# Patient Record
Sex: FEMALE | Race: WHITE | Employment: FULL TIME | URBAN - METROPOLITAN AREA
[De-identification: names, ages, dates, MRNs, and addresses within clinical notes are randomized per-mention and may not be internally consistent; named-entity substitution may affect disease eponyms.]

---

## 2017-05-11 ENCOUNTER — OFFICE VISIT (OUTPATIENT)
Dept: SURGERY | Age: 27
End: 2017-05-11

## 2017-05-11 VITALS
BODY MASS INDEX: 21.09 KG/M2 | SYSTOLIC BLOOD PRESSURE: 111 MMHG | HEART RATE: 83 BPM | DIASTOLIC BLOOD PRESSURE: 77 MMHG | WEIGHT: 119 LBS | HEIGHT: 63 IN

## 2017-05-11 DIAGNOSIS — Z80.3 FAMILY HISTORY OF BREAST CANCER: Primary | ICD-10-CM

## 2017-05-11 RX ORDER — NORGESTIMATE AND ETHINYL ESTRADIOL 7DAYSX3 LO
KIT ORAL
Refills: 0 | COMMUNITY
Start: 2017-04-11 | End: 2022-03-29

## 2017-05-11 NOTE — PROGRESS NOTES
HISTORY OF PRESENT ILLNESS  Shakeel Wilkerson is a 24 y.o. female. HPI  NEW patient consult referred by Dr. Albert Santiago for high risk family history of breast cancer. The last week she has noticed an annoying discomfort or soreness to her RIGHT breast.  Denies any breast, skin, or nipple changes. OB hx:  Period started age 17. LMP: 2017  First child age 27.  2 children. Breast fed. No hysterectomy or oophorectomy. On OCP  Family History: Mother  from breast cancer, diagnosed age 50, mastectomy and chemo,  age 48 from lung mets. Maternal grandmother  from breast cancer in her late 52's. Maternal aunt had breast cancer in her late 29's and is a survivor. Pt does not have sisters. 2 1/2 yr old daughter and 3 yr old son. No other      No imaging. Review of Systems   Constitutional: Negative. Breast pain with periods  Irregular periods   HENT: Negative. Eyes: Negative. Respiratory: Negative. Cardiovascular: Negative. Gastrointestinal: Negative. Genitourinary: Negative. Musculoskeletal: Negative. Skin: Negative. Neurological: Negative. Endo/Heme/Allergies: Negative. Psychiatric/Behavioral: The patient is nervous/anxious. Physical Exam   Constitutional: She is oriented to person, place, and time. She appears well-developed and well-nourished. Cardiovascular: Normal rate, regular rhythm and normal heart sounds. Pulmonary/Chest: Effort normal and breath sounds normal. Right breast exhibits no mass, no nipple discharge, no skin change and no tenderness. Left breast exhibits no mass, no nipple discharge, no skin change and no tenderness. Breasts are symmetrical.   Lymphadenopathy:     She has no cervical adenopathy. She has no axillary adenopathy. Right: No supraclavicular adenopathy present. Left: No supraclavicular adenopathy present. Neurological: She is alert and oriented to person, place, and time.    Skin: Skin is warm and dry.     BREAST ULTRASOUND  Indication: Bilateral  breast screening ultrasound  Technique: Both breast were scanned, all 4 quadrants, with a high-frequency linear-array near-field transducer  Findings: No abnormal mass, lesion, or shadowing noted. No cysts  Impression: Normal breast tissue   Disposition: No worrisome finding on ultrasound  ASSESSMENT and PLAN    ICD-10-CM ICD-9-CM    1. Family history of breast cancer Z80.3 V16.3 [facility]-ANALYSIS     1. No worrisome finding on physical exam or ultrasound. 2. Pt would like to have bilateral mastectomies with reconstruction. She has already met with Dr Lynn Sainz. She thinks she likely will have it done at the end of the summer. We discussed MRI screening if she decides not to have the surgery  3. She was BRCA tested today. PLAN:  Pt will contact the office when she is ready to schedule surgery.     She has [employer] [facility] insurance and would like to have surgery at [facility].

## 2017-05-11 NOTE — PATIENT INSTRUCTIONS
Breast Cancer (BRCA) Gene Testing: Care Instructions  Your Care Instructions  BRCA1 and BRCA2 are genes that help control normal cell growth. Sometimes, people inherit changes in one of these genes. These changes are called mutations. If you inherit a BRCA (say \"Susanne\") mutation, you have a greater risk of breast or ovarian cancer. You also have a higher risk of breast or ovarian cancer if you have a family history of them. Some people have a family history, but they don't have the BRCA mutation. To find out if you have the BRCA mutation, you can have a blood test. People who have the mutation have a higher risk for these cancers. But not everyone with the mutation gets cancer. Talk to your doctor about things that may increase your risk. Let your doctor know if you or a family member had or has:  · A positive test for BRCA. · Breast cancer before age 48. · Ovarian cancer at any age. · Male breast cancer. · Breast cancer in both breasts. · Both breast and ovarian cancer. · [place]. Your doctor may also want you to talk with a genetic counselor. The counselor can help you understand what the results of this test might mean. Follow-up care is a key part of your treatment and safety. Be sure to make and go to all appointments, and call your doctor if you are having problems. It's also a good idea to know your test results and keep a list of the medicines you take. Why should you have BRCA testing? You may feel better if the test shows that you don't have a BRCA mutation. This is called a negative result. If the test shows that you do have a BRCA mutation, it's called a positive result. In this case, you may be able to make some decisions that could reduce your cancer risk. The information may also be helpful to your family and loved ones. What are the risks of BRCA testing? A negative test may give you a false sense of security.  So you may not have the regular tests that help find cancer at an early stage. But a negative BRCA test does not mean that you will never have breast or ovarian cancer. A positive test result may cause anxiety or depression. A positive BRCA test does not mean that you will definitely get breast or ovarian cancer. It's important to think carefully about what the test results could mean for you. A genetic counselor can help you do this. What can you do to reduce the risk of breast cancer? Your risk for breast cancer increases as you get older. There is no known way to prevent breast cancer. But with some cancers, finding them early can increase your chances of successful treatment. Here are some steps you can take to help reduce your risk:  · Get familiar with the look and feel of your breasts. This will help you notice any changes. Call your doctor if you notice a change. · Have regular breast exams by your doctor or nurse. Ask your doctor how often you should get them. · Have regular mammograms. A mammogram is a picture of your breast tissue. It can find changes in your breast before you can feel them. Talk to your doctor about when to get this test.  You can also help take care of yourself and reduce your risk of cancer if you:  · Stay at a healthy weight. · Eat a healthy, low-fat diet. · Get some exercise every day. If you don't usually exercise, walking is a good way to start. · Don't smoke. If you need help quitting, talk to your doctor about stop-smoking programs and medicines. These can increase your chances of quitting for good. · Drink alcohol in moderation. Or don't drink alcohol at all. · Breastfeed. There is some evidence that breastfeeding may lower the risk of breast cancer. The benefit seems to be greatest in women who have  for longer than 12 months or who  several children. Men and women who do a gene test and find out that they have a BRCA gene change have some options to manage their cancer risk.   · Women who haven't had cancer may want to think about starting breast cancer screening at a younger age, taking medicine, and having preventive surgery. · Men may want to think about doing breast self-exams, having clinical breast exams, and having prostate cancer screening. If you have a BRCA gene change, talk with your doctor. He or she will help you manage your cancer risk. Where can you learn more? Go to http://jordan-bishop.info/. Enter U252 in the search box to learn more about \"Breast Cancer (BRCA) Gene Testing: Care Instructions. \"  Current as of: August 31, 2016  Content Version: 11.2  © 7013-2544 [employer]. Care instructions adapted under license by [employer] (which disclaims liability or warranty for this information). If you have questions about a medical condition or this instruction, always ask your healthcare professional. [address] any warranty or liability for your use of this information.

## 2017-05-12 NOTE — COMMUNICATION BODY
HISTORY OF PRESENT ILLNESS  Mikayla Rice is a 24 y.o. female. HPI  NEW patient consult referred by Dr. Edson Pimentel for high risk family history of breast cancer. The last week she has noticed an annoying discomfort or soreness to her RIGHT breast.  Denies any breast, skin, or nipple changes. OB hx:  Period started age 17. LMP: 2017  First child age 27.  2 children. Breast fed. No hysterectomy or oophorectomy. On OCP  Family History: Mother  from breast cancer, diagnosed age 50, mastectomy and chemo,  age 48 from lung mets. Maternal grandmother  from breast cancer in her late 52's. Maternal aunt had breast cancer in her late 29's and is a survivor. Pt does not have sisters. 2 1/2 yr old daughter and 3 yr old son. No other      No imaging. Review of Systems   Constitutional: Negative. Breast pain with periods  Irregular periods   HENT: Negative. Eyes: Negative. Respiratory: Negative. Cardiovascular: Negative. Gastrointestinal: Negative. Genitourinary: Negative. Musculoskeletal: Negative. Skin: Negative. Neurological: Negative. Endo/Heme/Allergies: Negative. Psychiatric/Behavioral: The patient is nervous/anxious. Physical Exam   Constitutional: She is oriented to person, place, and time. She appears well-developed and well-nourished. Cardiovascular: Normal rate, regular rhythm and normal heart sounds. Pulmonary/Chest: Effort normal and breath sounds normal. Right breast exhibits no mass, no nipple discharge, no skin change and no tenderness. Left breast exhibits no mass, no nipple discharge, no skin change and no tenderness. Breasts are symmetrical.   Lymphadenopathy:     She has no cervical adenopathy. She has no axillary adenopathy. Right: No supraclavicular adenopathy present. Left: No supraclavicular adenopathy present. Neurological: She is alert and oriented to person, place, and time.    Skin: Skin is warm and dry.     BREAST ULTRASOUND  Indication: Bilateral  breast screening ultrasound  Technique: Both breast were scanned, all 4 quadrants, with a high-frequency linear-array near-field transducer  Findings: No abnormal mass, lesion, or shadowing noted. No cysts  Impression: Normal breast tissue   Disposition: No worrisome finding on ultrasound  ASSESSMENT and PLAN    ICD-10-CM ICD-9-CM    1. Family history of breast cancer Z80.3 V16.3 [facility]-ANALYSIS     1. No worrisome finding on physical exam or ultrasound. 2. Pt would like to have bilateral mastectomies with reconstruction. She has already met with Dr Maty Crook. She thinks she likely will have it done at the end of the summer. We discussed MRI screening if she decides not to have the surgery  3. She was BRCA tested today. PLAN:  Pt will contact the office when she is ready to schedule surgery.     She has [employer] [facility] insurance and would like to have surgery at [facility].

## 2017-05-26 ENCOUNTER — DOCUMENTATION ONLY (OUTPATIENT)
Dept: SURGERY | Age: 27
End: 2017-05-26

## 2017-05-26 NOTE — PROGRESS NOTES
Received call from [facility] to confirm collection date of saliva specimen for genetic testing (5/11/17) and what test was ordered (Breast Next).

## 2017-05-31 ENCOUNTER — TELEPHONE (OUTPATIENT)
Dept: SURGERY | Age: 27
End: 2017-05-31

## 2022-02-24 ENCOUNTER — OFFICE VISIT (OUTPATIENT)
Dept: SURGERY | Age: 32
End: 2022-02-24
Payer: COMMERCIAL

## 2022-02-24 ENCOUNTER — HOSPITAL ENCOUNTER (OUTPATIENT)
Dept: MAMMOGRAPHY | Age: 32
Discharge: HOME | End: 2022-02-24
Attending: SURGERY
Payer: COMMERCIAL

## 2022-02-24 VITALS
SYSTOLIC BLOOD PRESSURE: 122 MMHG | HEIGHT: 64 IN | BODY MASS INDEX: 20.49 KG/M2 | HEART RATE: 93 BPM | WEIGHT: 120 LBS | DIASTOLIC BLOOD PRESSURE: 82 MMHG

## 2022-02-24 DIAGNOSIS — Z01.818 PREOP EXAMINATION: ICD-10-CM

## 2022-02-24 DIAGNOSIS — Z01.818 PREOP EXAMINATION: Primary | ICD-10-CM

## 2022-02-24 DIAGNOSIS — Z80.3 FAMILY HX-BREAST MALIGNANCY: ICD-10-CM

## 2022-02-24 PROCEDURE — 99203 OFFICE O/P NEW LOW 30 MIN: CPT | Performed by: SURGERY

## 2022-02-24 PROCEDURE — 77067 SCR MAMMO BI INCL CAD: CPT

## 2022-02-24 NOTE — LETTER
2/24/2022    Patient: Christie Chowdary   YOB: 1990   Date of Visit: 2/24/2022     Dave Suárez NP  [address] [facility] [address]  Via Fax: [phone]    Dear Dave Paredes.HORTENSIA,      Thank you for referring Ms. Marcy Chavez to [facility] for evaluation. My notes for this consultation are attached. If you have questions, please do not hesitate to call me. I look forward to following your patient along with you.       Sincerely,    Shamika Lindo MD

## 2022-02-24 NOTE — PROGRESS NOTES
HISTORY OF PRESENT ILLNESS  Sun Galeano is a 36 y.o. female. HPI ESTABLISHED patient referred by Radha Archibald to discuss prophylacitc mastectomies. She has a strong family hx of breast cancer. She has a CHEK2 VUS (2017, no update from [facility]). She does not feel any breast masses. Has occasional random shooting pains in the breasts. 2017 VUS CHEK2    Family History: Mother  from breast cancer, diagnosed age 50, mastectomy and chemo,  age 48 from lung mets. Maternal grandmother  from breast cancer in her late 52's. Maternal aunt had breast cancer in her late 29's and is a survivor. Now in her 52's with stroke and MI. Pt does not have sisters. 7yr old daughter and 10 yr old son. First screening mammogram today  [facility] Results (most recent):  Results from [place] encounter on 22    [facility] 3D STACEY W MAMMO BI SCREENING INCL CAD    Narrative  STUDY:  Bilateral Digital Screening 3D breast tomosynthesis is performed. INDICATION:  Screening mammogram. Family history of breast malignancy. Preoperative examination. There is no prior mammogram for direct comparison. BREAST COMPOSITION: The breast tissue is heterogeneously dense, which could  obscure detection of small masses (approximately 51-75% glandular). FINDINGS: Bilateral digital screening mammography was performed, and is  interpreted in conjunction with a computer assisted detection (CAD) system. No  suspicious masses or calcifications are identified. There is no skin thickening  or nipple retraction. Impression  BIRADS 1: Negative. No mammographic evidence of malignancy. Recommend annual  bilateral screening mammography after age 36, unless earlier clinical  indication. In accordance with [place] legislation enacted 2012 (32.1-229 of the Code  of [place]), we have informed this patient by letter that she may have dense  breast tissue. Dense breast tissue can hide cancer or other abnormalities.  We  have instructed her to contact her referring physician if she has any questions  or concerns about this report. There are many factors that can increase a  woman's risk of developing breast cancer, including a family history of breast  cancer. A woman's lifetime risk of developing breast cancer can be estimated  using the Breast Cancer Risk Assessment Tool, found on the [employer] website (www.cancer.gov/bcrisktool/). The addition of breast MRI as a  screening tool may be useful for women with lifetime risk assessments greater  than 20%.       Past Medical History:   Diagnosis Date    Nausea & vomiting     PONV X2 surgeries    Unspecified adverse effect of anesthesia     \"takes more than usual to get me to sleep\", w/every surgery, not wisdom teeth sedation     Past Surgical History:   Procedure Laterality Date    HX ORTHOPAEDIC Right 2006    ORIF, ankle with 2 screws place    HX ORTHOPAEDIC Right 2007    screws removed from ankle    HX ORTHOPAEDIC Left 2013    ORIF, ankle w/2 wires, 8 screws and 2 plates    HX OTHER SURGICAL      wisdom teeth w/sedation    HX WISDOM TEETH EXTRACTION      RI LEG/ANKLE SURGERY PROC UNLISTED        OB History        2    Para   2    Term   2            AB        Living   2       SAB        IAB        Ectopic        Molar        Multiple   0    Live Births   2          Obstetric Comments   Menarche 17, LMP 2/15/22, # of children 2, age of 4st delivery 27, Hysterectomy/oophorectomy no/no/, Breast bx no, history of breast feeding no, BCP yes, Hormone therapy no           Family History   Problem Relation Age of Onset    Breast Cancer Mother     No Known Problems Father     No Known Problems Brother     No Known Problems Son     No Known Problems Daughter     Breast Cancer Maternal Grandmother     Breast Cancer Maternal Aunt      Social History     Tobacco Use    Smoking status: Never Smoker    Smokeless tobacco: Never Used Substance Use Topics    Alcohol use: Yes     Comment: 2 x per week      Prior to Admission medications    Medication Sig Start Date End Date Taking? Authorizing Provider   TRI-LO-SPRINTEC 0.18/0.215/0.25 mg-25 mcg tab TAKE ONE TABLET BY MOUTH EVERY DAY 4/11/17  Yes Provider, Historical      No Known Allergies     ROS    Physical Exam  Constitutional:       Appearance: She is well-developed. Cardiovascular:      Rate and Rhythm: Normal rate and regular rhythm. Heart sounds: Normal heart sounds. Pulmonary:      Effort: Pulmonary effort is normal.      Breath sounds: Normal breath sounds. Chest:   Breasts: Breasts are symmetrical.      Right: No swelling, mass, nipple discharge, skin change, tenderness, axillary adenopathy or supraclavicular adenopathy. Left: No swelling, mass, nipple discharge, skin change, tenderness, axillary adenopathy or supraclavicular adenopathy. Lymphadenopathy:      Upper Body:      Right upper body: No supraclavicular or axillary adenopathy. Left upper body: No supraclavicular or axillary adenopathy. Skin:     General: Skin is warm and dry. Neurological:      Mental Status: She is alert and oriented to person, place, and time. ASSESSMENT and PLAN    ICD-10-CM ICD-9-CM    1. Preop examination  Z01.818 V72.84 CANCELED: PAM MAMMO BI SCREENING INCL CAD   2. Family hx-breast malignancy  Z80.3 V16.3 CANCELED: PAM MAMMO BI SCREENING INCL CAD      Total time spent with patient: 30 minutes   1. Discussed prophylactic mastectomies with reconstruction. Pt had talked about nipple-sparing, direct-to-implant with a plastic surgeon previously. 2. Mammogram today, BIRADS 1  3. appt with Dr Henrietta Ferris, then we will schedule surgery. 4. Discussed one overnight in the hospital, home with drains which stay in 1-2 weeks.

## 2022-03-03 ENCOUNTER — TELEPHONE (OUTPATIENT)
Dept: SURGERY | Age: 32
End: 2022-03-03

## 2022-03-15 DIAGNOSIS — Z80.3 FAMILY HISTORY OF BREAST CANCER IN MOTHER: Primary | ICD-10-CM

## 2022-03-19 PROBLEM — Z80.3 FAMILY HX-BREAST MALIGNANCY: Status: ACTIVE | Noted: 2022-02-24

## 2022-03-29 ENCOUNTER — HOSPITAL ENCOUNTER (OUTPATIENT)
Dept: PREADMISSION TESTING | Age: 32
Discharge: HOME | End: 2022-03-29
Payer: COMMERCIAL

## 2022-03-29 VITALS
HEIGHT: 64 IN | WEIGHT: 128.8 LBS | HEART RATE: 77 BPM | RESPIRATION RATE: 18 BRPM | TEMPERATURE: 98.2 F | OXYGEN SATURATION: 100 % | DIASTOLIC BLOOD PRESSURE: 83 MMHG | BODY MASS INDEX: 21.99 KG/M2 | SYSTOLIC BLOOD PRESSURE: 124 MMHG

## 2022-03-29 LAB
ABO + RH BLD: NORMAL
BASOPHILS # BLD: 0.1 K/UL (ref 0–0.1)
BASOPHILS NFR BLD: 1 % (ref 0–1)
BLOOD GROUP ANTIBODIES SERPL: NORMAL
DIFFERENTIAL METHOD BLD: NORMAL
EOSINOPHIL # BLD: 0.1 K/UL (ref 0–0.4)
EOSINOPHIL NFR BLD: 1 % (ref 0–7)
ERYTHROCYTE [DISTWIDTH] IN BLOOD BY AUTOMATED COUNT: 11.8 % (ref 11.5–14.5)
HCT VFR BLD AUTO: 39 % (ref 35–47)
HGB BLD-MCNC: 13.5 G/DL (ref 11.5–16)
IMM GRANULOCYTES # BLD AUTO: 0 K/UL (ref 0–0.04)
IMM GRANULOCYTES NFR BLD AUTO: 0 % (ref 0–0.5)
LYMPHOCYTES # BLD: 2.8 K/UL (ref 0.8–3.5)
LYMPHOCYTES NFR BLD: 44 % (ref 12–49)
MCH RBC QN AUTO: 32.5 PG (ref 26–34)
MCHC RBC AUTO-ENTMCNC: 34.6 G/DL (ref 30–36.5)
MCV RBC AUTO: 93.8 FL (ref 80–99)
MONOCYTES # BLD: 0.3 K/UL (ref 0–1)
MONOCYTES NFR BLD: 5 % (ref 5–13)
NEUTS SEG # BLD: 3.2 K/UL (ref 1.8–8)
NEUTS SEG NFR BLD: 49 % (ref 32–75)
NRBC # BLD: 0 K/UL (ref 0–0.01)
NRBC BLD-RTO: 0 PER 100 WBC
PLATELET # BLD AUTO: 305 K/UL (ref 150–400)
PMV BLD AUTO: 9.5 FL (ref 8.9–12.9)
RBC # BLD AUTO: 4.16 M/UL (ref 3.8–5.2)
SPECIMEN EXP DATE BLD: NORMAL
WBC # BLD AUTO: 6.5 K/UL (ref 3.6–11)

## 2022-03-29 PROCEDURE — 85025 COMPLETE CBC W/AUTO DIFF WBC: CPT

## 2022-03-29 PROCEDURE — 36415 COLL VENOUS BLD VENIPUNCTURE: CPT

## 2022-03-29 PROCEDURE — 86900 BLOOD TYPING SEROLOGIC ABO: CPT

## 2022-03-29 RX ORDER — NORGESTIMATE AND ETHINYL ESTRADIOL 7DAYSX3 28
1 KIT ORAL
COMMUNITY

## 2022-03-29 RX ORDER — ACETAMINOPHEN 500 MG
1500 TABLET ORAL
COMMUNITY

## 2022-04-01 ENCOUNTER — ANESTHESIA EVENT (OUTPATIENT)
Dept: SURGERY | Age: 32
End: 2022-04-01
Payer: COMMERCIAL

## 2022-04-04 ENCOUNTER — ANESTHESIA (OUTPATIENT)
Dept: SURGERY | Age: 32
End: 2022-04-04
Payer: COMMERCIAL

## 2022-04-04 ENCOUNTER — HOSPITAL ENCOUNTER (OUTPATIENT)
Age: 32
Setting detail: OUTPATIENT SURGERY
Discharge: HOME | End: 2022-04-04
Attending: SURGERY | Admitting: SURGERY
Payer: COMMERCIAL

## 2022-04-04 VITALS
OXYGEN SATURATION: 100 % | WEIGHT: 128.53 LBS | SYSTOLIC BLOOD PRESSURE: 114 MMHG | HEART RATE: 92 BPM | BODY MASS INDEX: 21.94 KG/M2 | DIASTOLIC BLOOD PRESSURE: 78 MMHG | HEIGHT: 64 IN | TEMPERATURE: 98 F | RESPIRATION RATE: 12 BRPM

## 2022-04-04 DIAGNOSIS — Z80.3 FAMILY HISTORY OF BREAST CANCER IN MOTHER: ICD-10-CM

## 2022-04-04 LAB — HCG UR QL: NEGATIVE

## 2022-04-04 PROCEDURE — 88307 TISSUE EXAM BY PATHOLOGIST: CPT

## 2022-04-04 PROCEDURE — 77030040506 HC DRN WND MDII -A: Performed by: SURGERY

## 2022-04-04 PROCEDURE — 19303 MAST SIMPLE COMPLETE: CPT | Performed by: SURGERY

## 2022-04-04 PROCEDURE — 74011250636 HC RX REV CODE- 250/636: Performed by: SURGERY

## 2022-04-04 PROCEDURE — 77030019940 HC BLNKT HYPOTHRM STRY -B: Performed by: SURGERY

## 2022-04-04 PROCEDURE — 77030011825 HC SUPP SURG PSTOP S2SG -B: Performed by: SURGERY

## 2022-04-04 PROCEDURE — C9290 INJ, BUPIVACAINE LIPOSOME: HCPCS | Performed by: SURGERY

## 2022-04-04 PROCEDURE — 74011000272 HC RX REV CODE- 272: Performed by: SURGERY

## 2022-04-04 PROCEDURE — 76060000067 HC AMB SURG ANES 3.5 TO 4 HR: Performed by: SURGERY

## 2022-04-04 PROCEDURE — 76210000057 HC AMBSU PH II REC 1 TO 1.5 HR: Performed by: SURGERY

## 2022-04-04 PROCEDURE — 77030002933 HC SUT MCRYL J&J -A: Performed by: SURGERY

## 2022-04-04 PROCEDURE — 74011000250 HC RX REV CODE- 250: Performed by: SURGERY

## 2022-04-04 PROCEDURE — 77030026438 HC STYL ET INTUB CARD -A: Performed by: ANESTHESIOLOGY

## 2022-04-04 PROCEDURE — 81025 URINE PREGNANCY TEST: CPT

## 2022-04-04 PROCEDURE — 74011250636 HC RX REV CODE- 250/636: Performed by: ANESTHESIOLOGY

## 2022-04-04 PROCEDURE — 2709999900 HC NON-CHARGEABLE SUPPLY: Performed by: SURGERY

## 2022-04-04 PROCEDURE — 77030013079 HC BLNKT BAIR HGGR 3M -A: Performed by: ANESTHESIOLOGY

## 2022-04-04 PROCEDURE — 77030031139 HC SUT VCRL2 J&J -A: Performed by: SURGERY

## 2022-04-04 PROCEDURE — 77030008684 HC TU ET CUF COVD -B: Performed by: ANESTHESIOLOGY

## 2022-04-04 PROCEDURE — 74011000250 HC RX REV CODE- 250: Performed by: NURSE ANESTHETIST, CERTIFIED REGISTERED

## 2022-04-04 PROCEDURE — 77030002966 HC SUT PDS J&J -A: Performed by: SURGERY

## 2022-04-04 PROCEDURE — 74011000258 HC RX REV CODE- 258: Performed by: SURGERY

## 2022-04-04 PROCEDURE — 77030020061 HC IV BLD WRMR ADMIN SET 3M -B: Performed by: ANESTHESIOLOGY

## 2022-04-04 PROCEDURE — 77030002986 HC SUT PROL J&J -A: Performed by: SURGERY

## 2022-04-04 PROCEDURE — 76030000007 HC AMB SURG OR TIME 3.5 TO 4: Performed by: SURGERY

## 2022-04-04 PROCEDURE — C1789 PROSTHESIS, BREAST, IMP: HCPCS | Performed by: SURGERY

## 2022-04-04 PROCEDURE — 77030011264 HC ELECTRD BLD EXT COVD -A: Performed by: SURGERY

## 2022-04-04 PROCEDURE — 77030002916 HC SUT ETHLN J&J -A: Performed by: SURGERY

## 2022-04-04 PROCEDURE — 76210000035 HC AMBSU PH I REC 1 TO 1.5 HR: Performed by: SURGERY

## 2022-04-04 PROCEDURE — 77030040361 HC SLV COMPR DVT MDII -B

## 2022-04-04 PROCEDURE — 77030008463 HC STPLR SKN PROX J&J -B: Performed by: SURGERY

## 2022-04-04 PROCEDURE — 74011250636 HC RX REV CODE- 250/636: Performed by: NURSE ANESTHETIST, CERTIFIED REGISTERED

## 2022-04-04 PROCEDURE — 77030002996 HC SUT SLK J&J -A: Performed by: SURGERY

## 2022-04-04 DEVICE — GRAFT HUM TISS L W13XL22CM THK0.7-1.4MM THN ACELLULAR: Type: IMPLANTABLE DEVICE | Site: BREAST | Status: FUNCTIONAL

## 2022-04-04 RX ORDER — FAMOTIDINE 10 MG/ML
INJECTION INTRAVENOUS AS NEEDED
Status: DISCONTINUED | OUTPATIENT
Start: 2022-04-04 | End: 2022-04-04 | Stop reason: HOSPADM

## 2022-04-04 RX ORDER — DIPHENHYDRAMINE HYDROCHLORIDE 50 MG/ML
12.5 INJECTION, SOLUTION INTRAMUSCULAR; INTRAVENOUS AS NEEDED
Status: DISCONTINUED | OUTPATIENT
Start: 2022-04-04 | End: 2022-04-04 | Stop reason: HOSPADM

## 2022-04-04 RX ORDER — SODIUM CHLORIDE, SODIUM LACTATE, POTASSIUM CHLORIDE, CALCIUM CHLORIDE 600; 310; 30; 20 MG/100ML; MG/100ML; MG/100ML; MG/100ML
INJECTION, SOLUTION INTRAVENOUS
Status: DISCONTINUED | OUTPATIENT
Start: 2022-04-04 | End: 2022-04-04 | Stop reason: HOSPADM

## 2022-04-04 RX ORDER — SODIUM CHLORIDE, SODIUM LACTATE, POTASSIUM CHLORIDE, CALCIUM CHLORIDE 600; 310; 30; 20 MG/100ML; MG/100ML; MG/100ML; MG/100ML
125 INJECTION, SOLUTION INTRAVENOUS CONTINUOUS
Status: DISCONTINUED | OUTPATIENT
Start: 2022-04-04 | End: 2022-04-04 | Stop reason: HOSPADM

## 2022-04-04 RX ORDER — ONDANSETRON 2 MG/ML
4 INJECTION INTRAMUSCULAR; INTRAVENOUS AS NEEDED
Status: DISCONTINUED | OUTPATIENT
Start: 2022-04-04 | End: 2022-04-04 | Stop reason: HOSPADM

## 2022-04-04 RX ORDER — GLYCOPYRROLATE 0.2 MG/ML
INJECTION INTRAMUSCULAR; INTRAVENOUS AS NEEDED
Status: DISCONTINUED | OUTPATIENT
Start: 2022-04-04 | End: 2022-04-04 | Stop reason: HOSPADM

## 2022-04-04 RX ORDER — HYDROMORPHONE HYDROCHLORIDE 2 MG/ML
INJECTION, SOLUTION INTRAMUSCULAR; INTRAVENOUS; SUBCUTANEOUS AS NEEDED
Status: DISCONTINUED | OUTPATIENT
Start: 2022-04-04 | End: 2022-04-04 | Stop reason: HOSPADM

## 2022-04-04 RX ORDER — ONDANSETRON 2 MG/ML
INJECTION INTRAMUSCULAR; INTRAVENOUS AS NEEDED
Status: DISCONTINUED | OUTPATIENT
Start: 2022-04-04 | End: 2022-04-04 | Stop reason: HOSPADM

## 2022-04-04 RX ORDER — LIDOCAINE HYDROCHLORIDE 20 MG/ML
INJECTION, SOLUTION EPIDURAL; INFILTRATION; INTRACAUDAL; PERINEURAL AS NEEDED
Status: DISCONTINUED | OUTPATIENT
Start: 2022-04-04 | End: 2022-04-04 | Stop reason: HOSPADM

## 2022-04-04 RX ORDER — DEXAMETHASONE SODIUM PHOSPHATE 4 MG/ML
INJECTION, SOLUTION INTRA-ARTICULAR; INTRALESIONAL; INTRAMUSCULAR; INTRAVENOUS; SOFT TISSUE AS NEEDED
Status: DISCONTINUED | OUTPATIENT
Start: 2022-04-04 | End: 2022-04-04 | Stop reason: HOSPADM

## 2022-04-04 RX ORDER — FENTANYL CITRATE 50 UG/ML
INJECTION, SOLUTION INTRAMUSCULAR; INTRAVENOUS AS NEEDED
Status: DISCONTINUED | OUTPATIENT
Start: 2022-04-04 | End: 2022-04-04 | Stop reason: HOSPADM

## 2022-04-04 RX ORDER — SUCCINYLCHOLINE CHLORIDE 20 MG/ML
INJECTION INTRAMUSCULAR; INTRAVENOUS AS NEEDED
Status: DISCONTINUED | OUTPATIENT
Start: 2022-04-04 | End: 2022-04-04 | Stop reason: HOSPADM

## 2022-04-04 RX ORDER — LIDOCAINE HYDROCHLORIDE 10 MG/ML
0.1 INJECTION, SOLUTION EPIDURAL; INFILTRATION; INTRACAUDAL; PERINEURAL AS NEEDED
Status: DISCONTINUED | OUTPATIENT
Start: 2022-04-04 | End: 2022-04-04 | Stop reason: HOSPADM

## 2022-04-04 RX ORDER — MIDAZOLAM HYDROCHLORIDE 1 MG/ML
INJECTION, SOLUTION INTRAMUSCULAR; INTRAVENOUS AS NEEDED
Status: DISCONTINUED | OUTPATIENT
Start: 2022-04-04 | End: 2022-04-04 | Stop reason: HOSPADM

## 2022-04-04 RX ORDER — PROPOFOL 10 MG/ML
INJECTION, EMULSION INTRAVENOUS AS NEEDED
Status: DISCONTINUED | OUTPATIENT
Start: 2022-04-04 | End: 2022-04-04 | Stop reason: HOSPADM

## 2022-04-04 RX ORDER — NEOSTIGMINE METHYLSULFATE 1 MG/ML
INJECTION, SOLUTION INTRAVENOUS AS NEEDED
Status: DISCONTINUED | OUTPATIENT
Start: 2022-04-04 | End: 2022-04-04 | Stop reason: HOSPADM

## 2022-04-04 RX ORDER — ALBUTEROL SULFATE 0.83 MG/ML
2.5 SOLUTION RESPIRATORY (INHALATION) AS NEEDED
Status: DISCONTINUED | OUTPATIENT
Start: 2022-04-04 | End: 2022-04-04 | Stop reason: HOSPADM

## 2022-04-04 RX ORDER — ROCURONIUM BROMIDE 10 MG/ML
INJECTION, SOLUTION INTRAVENOUS AS NEEDED
Status: DISCONTINUED | OUTPATIENT
Start: 2022-04-04 | End: 2022-04-04 | Stop reason: HOSPADM

## 2022-04-04 RX ORDER — HYDROMORPHONE HYDROCHLORIDE 1 MG/ML
0.5 INJECTION, SOLUTION INTRAMUSCULAR; INTRAVENOUS; SUBCUTANEOUS
Status: DISCONTINUED | OUTPATIENT
Start: 2022-04-04 | End: 2022-04-04 | Stop reason: HOSPADM

## 2022-04-04 RX ORDER — POVIDONE-IODINE 10 %
SOLUTION, NON-ORAL TOPICAL AS NEEDED
Status: DISCONTINUED | OUTPATIENT
Start: 2022-04-04 | End: 2022-04-04 | Stop reason: HOSPADM

## 2022-04-04 RX ORDER — SODIUM CHLORIDE, SODIUM LACTATE, POTASSIUM CHLORIDE, CALCIUM CHLORIDE 600; 310; 30; 20 MG/100ML; MG/100ML; MG/100ML; MG/100ML
150 INJECTION, SOLUTION INTRAVENOUS CONTINUOUS
Status: DISCONTINUED | OUTPATIENT
Start: 2022-04-04 | End: 2022-04-04 | Stop reason: HOSPADM

## 2022-04-04 RX ADMIN — ONDANSETRON HYDROCHLORIDE 4 MG: 2 SOLUTION INTRAMUSCULAR; INTRAVENOUS at 07:45

## 2022-04-04 RX ADMIN — PROPOFOL 200 MCG/KG/MIN: 10 INJECTION, EMULSION INTRAVENOUS at 07:52

## 2022-04-04 RX ADMIN — MIDAZOLAM 4 MG: 1 INJECTION, SOLUTION INTRAMUSCULAR; INTRAVENOUS at 07:45

## 2022-04-04 RX ADMIN — GLYCOPYRROLATE 0.4 MG: 0.2 INJECTION INTRAMUSCULAR; INTRAVENOUS at 11:16

## 2022-04-04 RX ADMIN — ROCURONIUM BROMIDE 25 MG: 10 INJECTION INTRAVENOUS at 09:53

## 2022-04-04 RX ADMIN — FAMOTIDINE 20 MG: 10 INJECTION INTRAVENOUS at 07:45

## 2022-04-04 RX ADMIN — HYDROMORPHONE HYDROCHLORIDE 0.5 MG: 2 INJECTION, SOLUTION INTRAMUSCULAR; INTRAVENOUS; SUBCUTANEOUS at 11:07

## 2022-04-04 RX ADMIN — FENTANYL CITRATE 50 MCG: 50 INJECTION, SOLUTION INTRAMUSCULAR; INTRAVENOUS at 07:50

## 2022-04-04 RX ADMIN — SUCCINYLCHOLINE CHLORIDE 100 MG: 20 INJECTION, SOLUTION INTRAMUSCULAR; INTRAVENOUS at 07:53

## 2022-04-04 RX ADMIN — Medication 3 MG: at 11:16

## 2022-04-04 RX ADMIN — SODIUM CHLORIDE, POTASSIUM CHLORIDE, SODIUM LACTATE AND CALCIUM CHLORIDE: 600; 310; 30; 20 INJECTION, SOLUTION INTRAVENOUS at 07:45

## 2022-04-04 RX ADMIN — ROCURONIUM BROMIDE 15 MG: 10 INJECTION INTRAVENOUS at 08:43

## 2022-04-04 RX ADMIN — SODIUM CHLORIDE, POTASSIUM CHLORIDE, SODIUM LACTATE AND CALCIUM CHLORIDE 150 ML/HR: 600; 310; 30; 20 INJECTION, SOLUTION INTRAVENOUS at 06:59

## 2022-04-04 RX ADMIN — LIDOCAINE HYDROCHLORIDE 50 MG: 20 INJECTION, SOLUTION INTRAVENOUS at 07:52

## 2022-04-04 RX ADMIN — DEXAMETHASONE SODIUM PHOSPHATE 8 MG: 4 INJECTION, SOLUTION INTRAMUSCULAR; INTRAVENOUS at 07:59

## 2022-04-04 RX ADMIN — FENTANYL CITRATE 50 MCG: 50 INJECTION, SOLUTION INTRAMUSCULAR; INTRAVENOUS at 09:57

## 2022-04-04 RX ADMIN — ROCURONIUM BROMIDE 10 MG: 10 INJECTION INTRAVENOUS at 07:52

## 2022-04-04 RX ADMIN — PROPOFOL 200 MG: 10 INJECTION, EMULSION INTRAVENOUS at 07:53

## 2022-04-04 RX ADMIN — FENTANYL CITRATE 50 MCG: 50 INJECTION, SOLUTION INTRAMUSCULAR; INTRAVENOUS at 08:44

## 2022-04-04 RX ADMIN — CEFAZOLIN SODIUM 2 G: 1 POWDER, FOR SOLUTION INTRAMUSCULAR; INTRAVENOUS at 07:59

## 2022-04-04 RX ADMIN — FENTANYL CITRATE 50 MCG: 50 INJECTION, SOLUTION INTRAMUSCULAR; INTRAVENOUS at 08:10

## 2022-04-04 RX ADMIN — ROCURONIUM BROMIDE 10 MG: 10 INJECTION INTRAVENOUS at 10:40

## 2022-04-04 NOTE — BRIEF OP NOTE
Brief Postoperative Note    Patient: Jared Pardo  YOB: 1990  MRN: [id number]    Date of Procedure: 4/4/2022     Pre-Op Diagnosis: F/H OF BREAST CANCER    Post-Op Diagnosis: Same as preoperative diagnosis. Procedure(s):  BILATERAL BREAST PROPHYLACTIC MASTECTOMIES  BILATERAL BREAST RECONSTRUCTION, DIRECT TO IMPLANT RECONSTRUCTION AND FLEX HD    Surgeon(s):  MD Altagracia Perdomo MD    Surgical Assistant: Surg Asst-1: Emiliana ALARCON    Anesthesia: General     Estimated Blood Loss (mL): less than 821     Complications: None    Specimens:   ID Type Source Tests Collected by Time Destination   1 : [address] Preservative Breast  Joe Lee MD 4/4/2022 2156 Pathology   2 : PROPHYLACTIC RIGHT MASTECTOMY Preservative Breast  Joe Lee MD 4/4/2022 4453 Pathology        Implants:   Implant Name Type Inv.  Item Serial No.  Lot No. LRB No. Used Action   GRAFT HUM TISS L [id number] THK0.7-1.4MM THN ACELLULAR - [id number]  GRAFT HUM TISS L [id number] THK0.7-1.4MM THN ACELLULAR [id number] [employer] NA Right 286 Implanted   GRAFT HUM TISS L [id number] THK0.7-1.4MM THN ACELLULAR - [id number]  GRAFT HUM TISS L [id number] THK0.7-1.4MM THN ACELLULAR [id number] [employer] NA Left 286 Implanted   SIENTRA OPUS SILICONE GEL BREAST IMPLANT   [id number] [employer] NA Left 1 Implanted   SIENTRA OPUS SILICONE GEL BREAST IMPLANT   [id number] [employer] NA Right 1 Implanted       Drains:   German-Reaves Drain 04/04/22 Left Breast (Active)       German-Reaves Drain 04/04/22 Right Breast (Active)       Findings: none    Electronically Signed by Shashank Luis MD on 4/4/2022 at 1:23 PM

## 2022-04-04 NOTE — DISCHARGE INSTRUCTIONS
Breast Reconstruction with direct implant/ alloderm graft  Dr. Laureen Hurley, NP      Activities  Immediatly after  surgery you will rest quietly for the first 48 hours. You will be able to walk around the house and perform light daily activities; however, during this time, it is not at all unusual for you to feel some pain, soreness and pressure in the chest area. This will gradually subside and Dr. Verlena Meckel will give you pain medication to relieve it. Be sure to lie on your back whenever you rest or sleep. Keep your head elevated for 72 hours after surgery as this will help with swelling. No heavy exercise or lifting for three weeks following surgery. This will allow the implants to remain in the proper position without movement. For the first three days following surgery you should try to restrict your arm movements. Move your arms slowly and avoid sudden jerky movements of the chest and breast area. Keep your arms as close to your body as possible. This allows the implants to remain in place. Dr. Verlena Meckel encourages walking immediately after surgery. This activity will greatly minimize the risk of blood clots in your leg veins. Bathing: You will then be allowed to shower two days after surgery. The fluffy guaze that is also on your incisions will be able to come off and discarded. You will have steristrips on all of your incisions. The office staff will remove these steristrips approx 7-10 days surgery. Water may run over the steristrips gently, but, do not allow direct pressure of water on the steristrips. Do not submerge yourself in a bath, swimming pool, or whirlpool for two weeks. Under garments: The surgery bra that you have been put in should be worn constantly during the day and at night. You will be changed out of that surgery bra to a more comfortable bra in the office at your first post operative appointment.  You will wear the sports bra for a total of two to three weeks after surgery. You may also wear a soft sports bra with light support instead of the surgery bra if preferred. Drains: You will need to empty your drains twice a day and document output on a piece of paper. Document the output in cc or ml. Your drains could possibly stay in from 10 days to 3 weeks depending on how much comes out of your drains. Please bring this log with you to the office. The maximum swelling occurs at about three days and then begins to dramatically improve. Mild bruising typically resolves within 14 days. Medications:      Dilaudid: or Percocet  this is a your pain medication. Take one to two tablets as needed every 3-4 hours. No NSAIDS (advil, ibuprofen 5 days after surgery. This will increase your bleeding risk. Tylenol: (over the counter) 650 mg every 4 hours as needed for mild pain. Be careful because percocet has tylenol in it. You can not exceed 4000 mg a day of tylenol. Zofran: this is a medication for nausea. Take this as needed for nausea every 6-8 hours. Make sure you drink plenty of fluids. Nausea usually resolves after the first 24 hours. Augmentin or Levaquin: this is your antibiotic. Take this medication completley until empty. Valium: this is for muscle relaxation. Your tissue expander was placed beneath the pectoral muscle. This muscle can sometimes feel tight. Valium can help relax this muscle. Stool softeners: while taking narcotics, take a stool softener (over the counter) twice daily. Incease fluids, fiber. It is very important to keep your bowels regular while taking narcotics. Work: You should plan to be off work for 5-6 days, although this can vary from person to person. Do not expose your breasts to the sun for eight weeks after surgery. Follow up: Please present to Dr. Bree Gibson office at your scheduled appointment made prior to surgery.  If you do not have an appt, please call the office ASAP to make your first post op visit. We like to see you within one to two days after surgery. Please notify Dr. Demetra Gomez if:   If your one breast becomes significantly larger than the other   If you develop significant bruising across the chest    If you experience a significant increase in pain in the breast after the pain has improved   If you develop a temperature above 101.5° F   If you develop redness (like a sunburn) around your incisions  If you need help or have any questions feel free to call Dr Demetra Gomez with your concerns. Dr. Demetra Gomez is on call 24 hours per day, seven days per week and has an answering service to forward calls. Drain Care Instructions    Your surgery required the placement of drains to remove excess fluids from your wounds. You will notice clear tubes exiting your body connecting to small reservoirs. You will be taught how to care for the drains after surgery. 1)  Please empty the drains every twelve hours and record the volumes.  To do this, open the small lid on top of bulb and pour the drainage into a small container to measure. It is important that the volume of each drain is recorded separately. Every 24 hours total the individual drain outputs. When finished, squeeze the bulb and hold while replacing the lid. The bulb needs to be collapsed in order to be effective. After the volume becomes low enough the drains will be removed. 2) Please strip the drains every six hours while awake and as needed.  Stripping the drain tubes removes clots from the tubes and allows them to drain effectively. Stripping the tubes is very important to proper drain function. To do this, grasp the tubing close to your body with one hand and stabilize it. With the other hand, grasp the tubing below the first hand. Using an alcohol pad or lotion, pinch tubing tightly, sliding fingers down the tubing and away from your body; repeat this 2-3 times.  It is okay if the tube becomes flat from the suction. 3) The drainage will initially be red in color and progressively become thinner in character and change to a pale red and eventually become clear or straw colored. The time required for these changes to occur varies between patients. 4) You may shower 48 hours after surgery. Hold the drains while in the shower and let soap and water run over the incision sites. Pat dry. Please call the office if you have any questions or if we may be of assistance. Please call the office if you develop an elevated temperature or if anything concerns you. Please dont hesitate to report any unusual or concerning changes. Our number is [phone]. DISCHARGE SUMMARY from your Nurse      PATIENT INSTRUCTIONS    After general anesthesia or intravenous sedation, for 24 hours or while taking prescription Narcotics:  · Limit your activities  · Do not drive and operate hazardous machinery  · Do not make important personal or business decisions  · Do  not drink alcoholic beverages  · If you have not urinated within 8 hours after discharge, please contact your surgeon on call. Report the following to your surgeon:  · Excessive pain, swelling, redness or odor of or around the surgical area  · Temperature over 100.5  · Nausea and vomiting lasting longer than 4 hours or if unable to take medications  · Any signs of decreased circulation or nerve impairment to extremity: change in color, persistent  numbness, tingling, coldness or increase pain  · Any questions      GOOD HELP TO FIGHT AN INFECTION  Here are a few tip to help reduce the chance of getting an infection after surgery:   Wash Your Hands   Good handwashing is the most important thing you and your caregiver can do.  Wash before and after caring for any wounds. Dry your hand with a clean towel.  Wash with soap and water for at least 20 seconds.  A TIP: sing the \"Happy Birthday\" song through one time while washing to help with the timing.  Use a hand  in between washings.  Shower   When your surgeon says it is OK to take a shower, use a new bar of antibacterial soap (if that is what you use, and keep that bar of soap ONLY for your use), or antibacterial body wash.  Use a clean wash cloth or sponge when you bathe.  Dry off with a clean towel  after every bath - be careful around any wounds, skin staples, sutures or surgical glue over/on wounds.  Do not enter swimming pools, hot tubs, lakes, rivers and/or ocean until wounds are healed and your doctor/surgeon says it is OK.  Use Clean Sheets   Sleep on freshly laundered sheets after your surgery.  Keep the surgery site covered with a clean, dry bandage (if instructed to do so). If the bandage becomes soiled, reapply a new, dry, clean bandage.  Do not allow pets to sleep with you while your wound is healing.  Lifestyle Modification and Controlling Your Blood Sugar   Smoking slows wound healing. Stop smoking and limit exposure to second-hand smoke.  High blood sugar slows wound healing. Eat a well-balanced diet to provide proper nutrition while healing   Monitor your blood sugar (if you are a diabetic) and take your medications as you are suppose to so you can control you blood sugar after surgery. COUGH AND DEEP BREATHE    Breathing deeply and coughing are very important exercises to do after surgery. Deep breathing and coughing open the little air tubes and air sacks in your lungs. You take deep breaths every day. You may not even notice - it is just something you do when you sigh or yawn. It is a natural exercise you do to keep these air passages open. After surgery, take deep breaths and cough, on purpose. DIRECTIONS:  · Take 10 to 15 slow deep breaths every hour while awake. · Breathe in deeply, and hold it for 2 seconds. · Exhale slowly through puckered lips, like blowing up a balloon.   · After every 4th or 5th deep breath, hug your pillow to your chest or belly and give a hard, deep cough. Yes, it will probably hurt. But doing this exercise is a very important part of healing after surgery. Take your pain medicine to help you do this exercise without too much pain. Coughing and deep breathing help prevent bronchitis and pneumonia after surgery. If you had chest or belly surgery, use a pillow as a \"hug temitope\" and hold it tightly to your chest or belly when you cough. ANKLE PUMPS    Ankle pumps increase the circulation of oxygenated blood to your lower extremities and decrease your risk for circulation problems such as blood clots. They also stretch the muscles, tendons and ligaments in your foot and ankle, and prevent joint contracture in the ankle and foot, especially after surgeries on the legs. It is important to do ankle pump exercises regularly after surgery because immobility increases your risk for developing a blood clot. Your doctor may also have you take an Aspirin for the next few days as well. If your doctor did not ask you to take an Aspirin, consult with him before starting Aspirin therapy on your own. The exercise is quite simple. · Slowly point your foot forward, feeling the muscles on the top of your lower leg stretch, and hold this position for 5 seconds. · Next, pull your foot back toward you as far as possible, stretching the calf muscles, and hold that position for 5 seconds. · Repeat with the other foot. · Perform 10 repetitions every hour while awake for both ankles if possible (down and then up with the foot once is one repetition). You should feel gentle stretching of the muscles in your lower leg when doing this exercise. If you feel pain, or your range of motion is limited, don't push too hard. Only go the limit your joint and muscles will let you go.   If you have increasing pain, progressively worsening leg warmth or swelling, STOP the exercise and call your doctor. MEDICATION AND   SIDE EFFECT GUIDE    The [facility] MEDICATION AND SIDE EFFECT GUIDE was provided to the PATIENT AND CARE PROVIDER. Information provided includes instruction about drug purpose and common side effects for the following medications: These are general instructions for a healthy lifestyle:    *   Please give a list of your current medications to your Primary Care Provider. *   Please update this list whenever your medications are discontinued, doses are changed, or new medications (including over-the-counter products) are added. *   Please carry medication information at all times in case of emergency situations. About Smoking  No smoking / No tobacco products  Avoid exposure to second hand smoke     Surgeon General's Warning:  Quitting smoking now greatly reduces serious risk to your health. Obesity, smoking, and sedentary lifestyle greatly increases your risk for illness and disease. A healthy diet, regular physical exercise & weight monitoring are important for maintaining a healthy lifestyle. Congestive Heart Failure  You may be retaining fluid if you have a history of heart failure or if you experience any of the following symptoms:  Weight gain of 3 pounds or more overnight or 5 pounds in a week, increased swelling in your hands or feet or shortness of breath while lying flat in bed. Please call your doctor as soon as you notice any of these symptoms; do not wait until your next office visit. Recognize signs and symptoms of STROKE:  F -  Face looks uneven  A -  Arms unable to move or move evenly  S -  Speech slurred or non-existent  T -  Time-call 911 as soon as signs and symptoms begin-DO NOT go          back to bed or wait to see if you get better-TIME IS BRAIN. Warning Signs of HEART ATTACK   Call 911 if you have these symptoms:     Chest discomfort.  Most heart attacks involve discomfort in the center of the chest that lasts more than a few minutes, or that goes away and comes back. It can feel like uncomfortable pressure, squeezing, fullness, or pain.  Discomfort in other areas of the upper body. Symptoms can include pain or discomfort in one or both arms, the back, neck, jaw, or stomach.  Shortness of breath with or without chest discomfort.  Other signs may include breaking out in a cold sweat, nausea, or lightheadedness. Don't wait more than five minutes to call 911 - MINUTES MATTER! Fast action can save your life. Calling 911 is almost always the fastest way to get lifesaving treatment. Emergency Medical Services staff can begin treatment when they arrive -- up to an hour sooner than if someone gets to the hospital by car. Learning About Coronavirus [phone])  Coronavirus [phone]): Overview  What is coronavirus (COVID-19)? The coronavirus disease (COVID-19) is caused by a virus. It is an illness that was first found in [place], [place], in December 2019. It has since spread worldwide. The virus can cause fever, cough, and trouble breathing. In severe cases, it can cause pneumonia and make it hard to breathe without help. It can cause death. Coronaviruses are a large group of viruses. They cause the common cold. They also cause more serious illnesses like [place] East respiratory syndrome (MERS) and severe acute respiratory syndrome (SARS). COVID-19 is caused by a novel coronavirus. That means it's a new type that has not been seen in people before. This virus spreads person-to-person through droplets from coughing and sneezing. It can also spread when you are close to someone who is infected. And it can spread when you touch something that has the virus on it, such as a doorknob or a tabletop. What can you do to protect yourself from coronavirus (COVID-19)? The best way to protect yourself from getting sick is to:  · Avoid areas where there is an outbreak.   · Avoid contact with people who may be infected. · Wash your hands often with soap or alcohol-based hand sanitizers. · Avoid crowds and try to stay at least 6 feet away from other people. · Wash your hands often, especially after you cough or sneeze. Use soap and water, and scrub for at least 20 seconds. If soap and water aren't available, use an alcohol-based hand . · Avoid touching your mouth, nose, and eyes. What can you do to avoid spreading the virus to others? To help avoid spreading the virus to others:  · Cover your mouth with a tissue when you cough or sneeze. Then throw the tissue in the trash. · Use a disinfectant to clean things that you touch often. · Stay home if you are sick or have been exposed to the virus. Don't go to school, work, or public areas. And don't use public transportation. · If you are sick:  ? Leave your home only if you need to get medical care. But call the doctor's office first so they know you're coming. And wear a face mask, if you have one.  ? If you have a face mask, wear it whenever you're around other people. It can help stop the spread of the virus when you cough or sneeze. ? Clean and disinfect your home every day. Use household  and disinfectant wipes or sprays. Take special care to clean things that you grab with your hands. These include doorknobs, remote controls, phones, and handles on your refrigerator and microwave. And don't forget countertops, tabletops, bathrooms, and computer keyboards. When to call for help  Call 911 anytime you think you may need emergency care. For example, call if:  · You have severe trouble breathing. (You can't talk at all.)  · You have constant chest pain or pressure. · You are severely dizzy or lightheaded. · You are confused or can't think clearly. · Your face and lips have a blue color. · You pass out (lose consciousness) or are very hard to wake up.   Call your doctor now if you develop symptoms such as:  · Shortness of breath. · Fever. · Cough. If you need to get care, call ahead to the doctor's office for instructions before you go. Make sure you wear a face mask, if you have one, to prevent exposing other people to the virus. Where can you get the latest information? The following health organizations are tracking and studying this virus. Their websites contain the most up-to-date information. Lydia Wilks also learn what to do if you think you may have been exposed to the virus. · U.[place]. Centers for Disease Control and Prevention (CDC): The CDC provides updated news about the disease and travel advice. The website also tells you how to prevent the spread of infection. www.cdc.gov  · World Health Organization [facility]): WHO offers information about the virus outbreaks. WHO also has travel advice. www.who.int  Current as of: April 1, 2020               Content Version: 12.4  © 2006-2020 [employer]. Care instructions adapted under license by your healthcare professional. If you have questions about a medical condition or this instruction, always ask your healthcare professional. [place] [address] any warranty or liability for your use of this information. The discharge information has been reviewed with the patient. Any questions and concerns from the patient have been addressed. The patient verbalized understanding.         CONTENTS FOUND IN YOUR DISCHARGE ENVELOPE:  [x]     Surgeon and Hospital Discharge Instructions  [x]     [facility] Care Provider Card  []     Medication & Side Effect Guide            (your newly prescribed medications have been marked/highlighted showing the most common side effects from   the classes of drugs on your prescriptions)  []     Medication Prescription(s) x 0 ( [] These have been sent electronically to your pharmacy by your surgeon,   - OR -       your surgeon has already provided these to you during a previous/pre-op office visit)  []     [address] Education Information  []     Physical Therapy Prescription  []     Follow-up Appointment Cards  []     Surgery-related Pictures/Media  []     Pain block and/or block with On-Q Catheter from Anesthesia Service (information included in your instructions above)  []     Medical device information sheets/pamphlets from their    []     School/work excuse note. []     /parent work excuse note. The following personal items collected during your admission are returned to you:   Dental Appliance: Dental Appliances: None  Vision: Visual Aid: None  Hearing Aid:    Jewelry: Jewelry: None  Clothing: Clothing: Pajamas,Undergarments,Footwear,Socks  Other Valuables:  Other Valuables: Cell Phone  Valuables sent to safe:

## 2022-04-04 NOTE — H&P
History and Physical    Radha Mcdaniel is a 36 y.o. female. HPI ESTABLISHED patient referred by Jamshid Turcios to discuss prophylacitc mastectomies. She has a strong family hx of breast cancer. She has a CHEK2 VUS (2017, no update from [facility]). She does not feel any breast masses. Has occasional random shooting pains in the breasts.     2017 VUS CHEK2     Family History:    Mother  from breast cancer, diagnosed age 50, mastectomy and chemo,  age 48 from lung mets.    Maternal grandmother  from breast cancer in her late 52's.    Maternal aunt had breast cancer in her late 29's and is a survivor. Now in her 52's with stroke and MI.    Pt does not have sisters.  7yr old daughter and 10 yr old son.      First screening mammogram today  [facility] Results (most recent):  Results from [place] encounter on 22     [facility] 3D STACEY W MAMMO BI SCREENING INCL CAD     Narrative  STUDY:  Bilateral Digital Screening 3D breast tomosynthesis is performed.     INDICATION:  Screening mammogram. Family history of breast malignancy. Preoperative examination.     There is no prior mammogram for direct comparison.     BREAST COMPOSITION: The breast tissue is heterogeneously dense, which could  obscure detection of small masses (approximately 51-75% glandular).     FINDINGS: Bilateral digital screening mammography was performed, and is  interpreted in conjunction with a computer assisted detection (CAD) system. No  suspicious masses or calcifications are identified. There is no skin thickening  or nipple retraction.     Impression  BIRADS 1: Negative. No mammographic evidence of malignancy. Recommend annual  bilateral screening mammography after age 36, unless earlier clinical  indication.     In accordance with [place] legislation enacted 2012 (32.1-229 of the Code  of [place]), we have informed this patient by letter that she may have dense  breast tissue. Dense breast tissue can hide cancer or other abnormalities. We  have instructed her to contact her referring physician if she has any questions  or concerns about this report. There are many factors that can increase a  woman's risk of developing breast cancer, including a family history of breast  cancer. A woman's lifetime risk of developing breast cancer can be estimated  using the Breast Cancer Risk Assessment Tool, found on the [employer] website (www.cancer.gov/bcrisktool/). The addition of breast MRI as a  screening tool may be useful for women with lifetime risk assessments greater  than 20%.              Past Medical History:   Diagnosis Date    Nausea & vomiting       PONV X2 surgeries    Unspecified adverse effect of anesthesia       \"takes more than usual to get me to sleep\", w/every surgery, not wisdom teeth sedation            Past Surgical History:   Procedure Laterality Date    HX ORTHOPAEDIC Right 2006     ORIF, ankle with 2 screws place    HX ORTHOPAEDIC Right 2007     screws removed from ankle    HX ORTHOPAEDIC Left 2013     ORIF, ankle w/2 wires, 8 screws and 2 plates    HX OTHER SURGICAL        wisdom teeth w/sedation    HX WISDOM TEETH EXTRACTION       AR LEG/ANKLE SURGERY PROC UNLISTED                   OB History         2    Para   2    Term   2            AB        Living   2        SAB        IAB        Ectopic        Molar        Multiple   0    Live Births   2           Obstetric Comments   Menarche 17, LMP 2/15/22, # of children 2, age of 4st delivery 27, Hysterectomy/oophorectomy no/no/, Breast bx no, history of breast feeding no, BCP yes, Hormone therapy no                   Family History   Problem Relation Age of Onset    Breast Cancer Mother      No Known Problems Father      No Known Problems Brother      No Known Problems Son      No Known Problems Daughter      Breast Cancer Maternal Grandmother      Breast Cancer Maternal Aunt        Social History      Tobacco Use    Smoking status: Never Smoker    Smokeless tobacco: Never Used   Substance Use Topics    Alcohol use: Yes       Comment: 2 x per week              Prior to Admission medications    Medication Sig Start Date End Date Taking? Authorizing Provider   TRI-LO-SPRINTEC 0.18/0.215/0.25 mg-25 mcg tab TAKE ONE TABLET BY MOUTH EVERY DAY 4/11/17   Yes Provider, Historical      No Known Allergies     ROS     Physical Exam  Constitutional:       Appearance: She is well-developed. Cardiovascular:      Rate and Rhythm: Normal rate and regular rhythm. Heart sounds: Normal heart sounds. Pulmonary:      Effort: Pulmonary effort is normal.      Breath sounds: Normal breath sounds. Chest:   Breasts: Breasts are symmetrical.      Right: No swelling, mass, nipple discharge, skin change, tenderness, axillary adenopathy or supraclavicular adenopathy. Left: No swelling, mass, nipple discharge, skin change, tenderness, axillary adenopathy or supraclavicular adenopathy.         Lymphadenopathy:      Upper Body:      Right upper body: No supraclavicular or axillary adenopathy. Left upper body: No supraclavicular or axillary adenopathy. Skin:     General: Skin is warm and dry. Neurological:      Mental Status: She is alert and oriented to person, place, and time.          ASSESSMENT and PLAN      ICD-10-CM ICD-9-CM     1. Preop examination  Z01.818 V72.84 CANCELED: PAM MAMMO BI SCREENING INCL CAD   2. Family hx-breast malignancy  Z80.3 V16.3 CANCELED: PAM MAMMO BI SCREENING INCL CAD      Total time spent with patient: 30 minutes   1. Discussed prophylactic mastectomies with reconstruction. Pt had talked about nipple-sparing, direct-to-implant with a plastic surgeon previously. 2. Mammogram today, BIRADS 1  3. appt with Dr Shandra Zamudio, then we will schedule surgery. 4. Discussed one overnight in the hospital, home with drains which stay in 1-2 weeks.

## 2022-04-04 NOTE — ANESTHESIA POSTPROCEDURE EVALUATION
Procedure(s):  BILATERAL BREAST PROPHYLACTIC MASTECTOMIES  BILATERAL BREAST RECONSTRUCTION, DIRECT TO IMPLANT RECONSTRUCTION AND FLEX HD.    general, total IV anesthesia    Anesthesia Post Evaluation      Multimodal analgesia: multimodal analgesia not used between 6 hours prior to anesthesia start to PACU discharge  Patient location during evaluation: PACU  Patient participation: complete - patient participated  Level of consciousness: awake and alert  Pain score: 2  Pain management: satisfactory to patient  Airway patency: patent  Anesthetic complications: no  Cardiovascular status: hemodynamically stable and acceptable  Respiratory status: acceptable  Hydration status: acceptable  Comments: Patient seen and evaluated; no concerns. Post anesthesia nausea and vomiting:  controlled      INITIAL Post-op Vital signs:   Vitals Value Taken Time   /79 04/04/22 1300   Temp 36.7 °C (98 °F) 04/04/22 1150   Pulse 96 04/04/22 1301   Resp 17 04/04/22 1301   SpO2 98 % 04/04/22 1301   Vitals shown include unvalidated device data.

## 2022-04-04 NOTE — OP NOTES
[facility]  [address] [address]    Name: Katheryne Saint  : 1990    Bilateral Mastectomy followed by Reconstruction   Operative Report    Date of Surgery: 2022  Preoperative Diagnosis: family hx of  Breast Cancer. Pt has a VUS mutation  Postoperative Diagnosis: same  Surgeon: Dr Burton Johansen   Anesthesia: General  Procedure:  BILATERAL BREAST PROPHYLACTIC MASTECTOMIES   Indication: Family hx of breast cancer    Procedure Note:  The patient was brought to the operating room and placed on the table in the supine position. She was induced with general anesthesia. The chest was prepped and draped in the usual sterile fashion. The LEFT prophylactic mastectomy was done first.  This was performed through an /inframammary incision which was 7 cm in length. At the inframammary fold the tissue was dissected down to the pectoralis major muscle. The breast tissue was raised up from the pectoralis muscle using electrocautery. The skin flaps were elevated superiorly to the infraclavicular region, medially to the sternum and laterally to the mid axillary line. The breast tissue was dissected down to the Pectoralis Major muscle using electrocautery. This was done in an inferior to superior fashion. The breast tissue was removed, marked with sutures for orientation purposes and sent to Pathology. The RIGHT prophylactic breast mastectomy was then performed in a similar fashion, marked with sutures and sent to pathology. Hemostasis was controlled throughout the procedure using electrocautery. Dr Gisella Aleman then placed bilateral breast implants. Sponge, needle and instrument counts were reported to be correct.     Findings:  Normal breast tissue  Estimated Blood Loss:  75 cc        Specimens:   ID Type Source Tests Collected by Time Destination   1 : PROPHYLACTIC LEFT MASTECTOMY Preservative Breast  Flora Ramos MD 2022 6875 Pathology   2 : PROPHYLACTIC RIGHT MASTECTOMY Preservative Breast  Patsy Stewart MD 4/4/2022 4263 Pathology      Complications: none  Implants: none  Assistant: Douglas Harris SA    Signed:  Candy Acevedo MD

## 2022-04-05 NOTE — OP NOTES
[facility]  OPERATIVE REPORT    Name:  Toña Lee  MR#:  [id number]  :  1990  ACCOUNT #:  [de-identified]  DATE OF SERVICE:  2022    PREOPERATIVE DIAGNOSES:  1. Genetic propensity to breast cancer. 2.  Surgical absence, bilateral breasts. 3.  Open wounds of right and left breasts. POSTOPERATIVE DIAGNOSES:  1. Genetic propensity to breast cancer. 2.  Surgical absence, bilateral breasts. 3.  Open wounds of right and left breasts. PROCEDURES PERFORMED:  1. Reconstruction of right and left breasts with insertion of breast prosthesis. 2.  Reconstruction of right and left breasts with pectoralis major muscle flap. 3.  Reconstruction of right and left breast with acellular dermal matrix, 22 cm x 13 cm each. 4.  Reconstruction of right and left breasts with superiorly-based fasciocutaneous flap, 15 cm x 10 cm each. SURGEON:  Artemio Orta MD    ASSISTANT:  Ursula Patterson NP. Due to the complexity of this procedure, surgical assistance was required. HORTENSIA Hoyos assisted with the raising of the pectoralis major muscle flap, inserting of silicone breast prosthesis, placement of acellular dermal matrix, hemostasis, drain placement, Exparel placement, creation and inset of superior fasciocutaneous flap and complex closure of the wounds and dressing placement. ANESTHESIA:  General endotracheal.    COMPLICATIONS:  None. SPECIMENS REMOVED:  None. IMPLANTS:  See notes. ESTIMATED BLOOD LOSS:  50 mL. DRAINS:  19-Setswana Navin x2. COUNTS:  Correct x2. DISPOSITION:  Stable to PACU.    BRIEF HISTORY:  The patient is a 72-year-old female with a strong family history of breast cancer necessitating bilateral total skin and nipple-areolar sparing mastectomies with immediate direct implant reconstruction with acellular dermal matrix through an extended inframammary approach.   The overall procedure, incisional approach, expected outcomes and recovery were discussed. The risks, benefits and alternatives to the procedure including, but not limited to, bleeding, infection, damage to surrounding tissue structures, the need for revisional surgery, seroma, hematoma, capsule contracture, implant rupture, implant deflation, the need for future implant maintenance and surveillance MRIs, nonincorporation of acellular dermal matrix, flap necrosis, fat necrosis, PE and DVT were discussed. She understood these risks and agreed to proceed. PROCEDURE:  The preoperative markings were made with the patient in the standing position. Informed consent was obtained. The patient was taken to the operating room, placed supine on the operating table. Sequential compression boots were placed. General endotracheal anesthesia was obtained. A lower body Juan J Hugger and Das catheter were placed. The chest was prepped and draped in the usual sterile fashion. The mastectomy portion of the procedure was performed by Dr. Jan Rick and will be dictated under a separate note. At the conclusion of her procedure, the field was prepped and redraped in the usual sterile fashion. Attention was then turned to the right chest wall. The overlying mastectomy flap on each side showed good perfusion that was approximately 1.5-22 cm thick throughout. The pectoralis major muscle was identified and incised along its lateral border. The pectoralis major muscle was then raised as a muscle flap in a lateral to medial-zhu direction basing the muscle on its medial  and thoracoacromial blood supply. The inferolateral border of the muscle was completely released from the chest wall. The pocket was then irrigated with 500 mL of half-strength Betadine solution, 2 liters of triple antibiotic solution. Acellular dermal matrix was then soaked in triple antibiotic solution. The graft was then sutured along the inframammary fold, lateral mammary fold with a running 2-0 PDS suture.   The graft was placed to stabilize the inframammary fold and to improve soft tissue coverage of the lower pole of the implant. Multiple silicone sizers were placed and a Sientra smooth, round, moderate plus profile, 435 mL implant was chosen. The superior border of the acellular dermal matrix graft was then sutured to the inferior border of the pectoralis major muscle. A Sientra smooth, round, moderate plus profile silicone implant, 329 mL, serial number [id number] was then presoaked in triple antibiotic solution. Gloves were changed. The implant was then placed via a Warner funnel through a small window within the acellular dermal matrix graft to the subpectoral pocket. Orientation of the implant was closed. The wound was closed with running 2-0 PDS suture. A #19-Sami Lubertha Murphy drain was brought through a lateral stab incision and secured to the skin with 3-0 nylon and placed within the wound bed. Exparel 20 mL was expanded to 100 mL. 50 mL was injected into the pectoralis major muscle and along the chest wall to provide long-lasting local anesthesia. The superior fasciocutaneous breast flap was transposed inferiorly to its new anatomic position. The wound was then closed with a running subcutaneous and deep dermal 2-0 Monocryl and running subcuticular 3-0 Monocryl. The same exact procedure was repeated on the left side with implant serial number [id number]. Acellular dermal matrix, serial number [id number] was used on the right and serial number [id number] on the left. The wounds were then dressed with Steri-Strips, 4x4s, Medipore tape. A light dressing was placed. Anesthesia was then discontinued. The patient extubated in the operating room having tolerated the procedure well. The needle, instrument and laparotomy pad counts at the end of the case were correct.       Selena Carballo MD      [id number]  D:  04/05/2022 16:54  T:  04/05/2022 17:56  JOB #:  [id number]

## 2022-04-06 ENCOUNTER — TELEPHONE (OUTPATIENT)
Dept: SURGERY | Age: 32
End: 2022-04-06

## 2022-04-06 NOTE — TELEPHONE ENCOUNTER
POD#2    Called patient  No cancer in prophylactic mastectomy specimens  She is doing well  Drain sites are painful.     Follow up with Dr Liliana Thomas

## 2022-06-20 NOTE — PERIOP NOTES
[address], [address] [facility]   MAIN OR                                  [phone]   MAIN PRE OP                          [phone]                                                                                AMBULATORY PRE OP          [phone]  PRE-ADMISSION TESTING    [phone]     Surgery Date: 6/27 Monday        Is surgery arrival time given by surgeon? NO  If NO, [facility] staff will call you between 3 and 7pm the day before your surgery with your arrival time. (If your surgery is on a Monday, we will call you the Friday before.)    Call [phone] after 7pm Monday-Friday if you did not receive this call. INSTRUCTIONS BEFORE YOUR SURGERY   When You  Arrive Arrive at the [address] [facility] on the day of your surgery  Have your insurance card, photo ID, and any copayment (if needed)   Food   and   Drink NO food or drink after midnight the night before surgery    This means NO water, gum, mints, coffee, juice, etc.  No alcohol (beer, wine, liquor) 24 hours before and after surgery   Medications to   TAKE   Morning of Surgery MEDICATIONS TO TAKE THE MORNING OF SURGERY WITH A SIP OF WATER:    None   Medications  To  STOP      7 days before surgery  Non-Steroidal anti-inflammatory Drugs (NSAID's): for example, Ibuprofen (Advil, Motrin), Naproxen (Aleve)   Aspirin, if taking for pain    Herbal supplements, vitamins, and fish oil   Other:  (Pain medications not listed above, including Tylenol may be taken)   Blood  Thinners  If you take  Aspirin, Plavix, Coumadin, or any blood-thinning or anti-blood clot medicine, talk to the doctor who prescribed the medications for pre-operative instructions.    Bathing Clothing  Jewelry  Valuables      If you shower the morning of surgery, please do not apply anything to your skin (lotions, powders, deodorant, or makeup, especially mascara)   Do not shave or trim anywhere 24 hours before surgery   Wear your hair loose or down; no pony-tails, buns, or metal hair clips   Wear loose, comfortable, clean clothes   Wear glasses instead of contacts  [address], valuables, and jewelry, including body piercings, at home   If you were given an [employer], bring it on day of surgery. Going Home - or Spending the Night  SAME-DAY SURGERY: You must have a responsible adult drive you home and stay with you 24 hours after surgery   ADMITS: If your doctor is keeping you in the hospital after surgery, leave personal belongings/luggage in your car until you have a hospital room number. Hospital discharge time is 12 noon  Drivers must be here before 12 noon unless you are told differently   Special Instructions        Follow all instructions so your surgery wont be cancelled. Please, be on time. If a situation occurs and you are delayed the day of surgery, call [phone]. If your physical condition changes (like a fever, cold, flu, etc.) call your surgeon. Home medication(s) reviewed and verified verbally with list during PAT appointment. The patient was contacted via phone    The patient verbalizes understanding of all instructions and does not need reinforcement.

## 2022-06-24 ENCOUNTER — ANESTHESIA EVENT (OUTPATIENT)
Dept: SURGERY | Age: 32
End: 2022-06-24
Payer: COMMERCIAL

## 2022-06-27 ENCOUNTER — HOSPITAL ENCOUNTER (OUTPATIENT)
Age: 32
Setting detail: OUTPATIENT SURGERY
Discharge: HOME | End: 2022-06-27
Attending: SURGERY | Admitting: SURGERY
Payer: COMMERCIAL

## 2022-06-27 ENCOUNTER — ANESTHESIA (OUTPATIENT)
Dept: SURGERY | Age: 32
End: 2022-06-27
Payer: COMMERCIAL

## 2022-06-27 VITALS
OXYGEN SATURATION: 100 % | HEART RATE: 76 BPM | SYSTOLIC BLOOD PRESSURE: 109 MMHG | TEMPERATURE: 97 F | DIASTOLIC BLOOD PRESSURE: 70 MMHG | RESPIRATION RATE: 12 BRPM | HEIGHT: 64 IN | BODY MASS INDEX: 22.09 KG/M2 | WEIGHT: 129.41 LBS

## 2022-06-27 LAB — HCG UR QL: NEGATIVE

## 2022-06-27 PROCEDURE — 77030040361 HC SLV COMPR DVT MDII -B

## 2022-06-27 PROCEDURE — 81025 URINE PREGNANCY TEST: CPT

## 2022-06-27 PROCEDURE — 74011250636 HC RX REV CODE- 250/636: Performed by: NURSE ANESTHETIST, CERTIFIED REGISTERED

## 2022-06-27 PROCEDURE — 74011250636 HC RX REV CODE- 250/636: Performed by: ANESTHESIOLOGY

## 2022-06-27 PROCEDURE — 76210000006 HC OR PH I REC 0.5 TO 1 HR: Performed by: SURGERY

## 2022-06-27 PROCEDURE — 2709999900 HC NON-CHARGEABLE SUPPLY: Performed by: SURGERY

## 2022-06-27 PROCEDURE — C1789 PROSTHESIS, BREAST, IMP: HCPCS | Performed by: SURGERY

## 2022-06-27 PROCEDURE — 77030019940 HC BLNKT HYPOTHRM STRY -B: Performed by: SURGERY

## 2022-06-27 PROCEDURE — 74011250637 HC RX REV CODE- 250/637: Performed by: STUDENT IN AN ORGANIZED HEALTH CARE EDUCATION/TRAINING PROGRAM

## 2022-06-27 PROCEDURE — 77030011264 HC ELECTRD BLD EXT COVD -A: Performed by: SURGERY

## 2022-06-27 PROCEDURE — 77030002966 HC SUT PDS J&J -A: Performed by: SURGERY

## 2022-06-27 PROCEDURE — 77030011825 HC SUPP SURG PSTOP S2SG -B: Performed by: SURGERY

## 2022-06-27 PROCEDURE — 77030008684 HC TU ET CUF COVD -B: Performed by: ANESTHESIOLOGY

## 2022-06-27 PROCEDURE — 77030002974 HC SUT PLN J&J -A: Performed by: SURGERY

## 2022-06-27 PROCEDURE — 74011000250 HC RX REV CODE- 250: Performed by: SURGERY

## 2022-06-27 PROCEDURE — 76010000153 HC OR TIME 1.5 TO 2 HR: Performed by: SURGERY

## 2022-06-27 PROCEDURE — 77030040922 HC BLNKT HYPOTHRM STRY -A

## 2022-06-27 PROCEDURE — 76060000034 HC ANESTHESIA 1.5 TO 2 HR: Performed by: SURGERY

## 2022-06-27 PROCEDURE — 74011250636 HC RX REV CODE- 250/636: Performed by: SURGERY

## 2022-06-27 PROCEDURE — 74011000250 HC RX REV CODE- 250: Performed by: NURSE ANESTHETIST, CERTIFIED REGISTERED

## 2022-06-27 PROCEDURE — 77030002933 HC SUT MCRYL J&J -A: Performed by: SURGERY

## 2022-06-27 PROCEDURE — 76210000021 HC REC RM PH II 0.5 TO 1 HR: Performed by: SURGERY

## 2022-06-27 PROCEDURE — 74011000258 HC RX REV CODE- 258: Performed by: SURGERY

## 2022-06-27 PROCEDURE — 77030036554: Performed by: SURGERY

## 2022-06-27 PROCEDURE — 77030026438 HC STYL ET INTUB CARD -A: Performed by: ANESTHESIOLOGY

## 2022-06-27 PROCEDURE — 77030013358: Performed by: SURGERY

## 2022-06-27 DEVICE — SILICONE GEL BREAST IMPLANT, SMOOTH ROUND, MODERATE PLUS PROFILE, 540 CC
Type: IMPLANTABLE DEVICE | Site: BREAST | Status: NON-FUNCTIONAL
Brand: SIENTRA SILICONE GEL BREAST IMPLANT
Removed: 2022-11-23

## 2022-06-27 DEVICE — SILICONE GEL BREAST IMPLANT, SMOOTH ROUND, MODERATE PLUS PROFILE, 575 CC
Type: IMPLANTABLE DEVICE | Site: BREAST | Status: FUNCTIONAL
Brand: SIENTRA SILICONE GEL BREAST IMPLANT

## 2022-06-27 RX ORDER — SODIUM CHLORIDE, SODIUM LACTATE, POTASSIUM CHLORIDE, CALCIUM CHLORIDE 600; 310; 30; 20 MG/100ML; MG/100ML; MG/100ML; MG/100ML
125 INJECTION, SOLUTION INTRAVENOUS CONTINUOUS
Status: DISCONTINUED | OUTPATIENT
Start: 2022-06-27 | End: 2022-06-27 | Stop reason: HOSPADM

## 2022-06-27 RX ORDER — SODIUM CHLORIDE 0.9 % (FLUSH) 0.9 %
5-40 SYRINGE (ML) INJECTION EVERY 8 HOURS
Status: DISCONTINUED | OUTPATIENT
Start: 2022-06-27 | End: 2022-06-27 | Stop reason: HOSPADM

## 2022-06-27 RX ORDER — SODIUM CHLORIDE, SODIUM LACTATE, POTASSIUM CHLORIDE, CALCIUM CHLORIDE 600; 310; 30; 20 MG/100ML; MG/100ML; MG/100ML; MG/100ML
INJECTION, SOLUTION INTRAVENOUS
Status: DISCONTINUED | OUTPATIENT
Start: 2022-06-27 | End: 2022-06-27

## 2022-06-27 RX ORDER — ALBUTEROL SULFATE 0.83 MG/ML
2.5 SOLUTION RESPIRATORY (INHALATION) AS NEEDED
Status: DISCONTINUED | OUTPATIENT
Start: 2022-06-27 | End: 2022-06-27 | Stop reason: HOSPADM

## 2022-06-27 RX ORDER — DEXAMETHASONE SODIUM PHOSPHATE 4 MG/ML
INJECTION, SOLUTION INTRA-ARTICULAR; INTRALESIONAL; INTRAMUSCULAR; INTRAVENOUS; SOFT TISSUE AS NEEDED
Status: DISCONTINUED | OUTPATIENT
Start: 2022-06-27 | End: 2022-06-27 | Stop reason: HOSPADM

## 2022-06-27 RX ORDER — FENTANYL CITRATE 50 UG/ML
INJECTION, SOLUTION INTRAMUSCULAR; INTRAVENOUS AS NEEDED
Status: DISCONTINUED | OUTPATIENT
Start: 2022-06-27 | End: 2022-06-27 | Stop reason: HOSPADM

## 2022-06-27 RX ORDER — ONDANSETRON 4 MG/1
4 TABLET, ORALLY DISINTEGRATING ORAL
Qty: 20 TABLET | Refills: 0 | Status: SHIPPED | OUTPATIENT
Start: 2022-06-27

## 2022-06-27 RX ORDER — HYDROMORPHONE HYDROCHLORIDE 1 MG/ML
.25-1 INJECTION, SOLUTION INTRAMUSCULAR; INTRAVENOUS; SUBCUTANEOUS
Status: DISCONTINUED | OUTPATIENT
Start: 2022-06-27 | End: 2022-06-27 | Stop reason: HOSPADM

## 2022-06-27 RX ORDER — SODIUM CHLORIDE 0.9 % (FLUSH) 0.9 %
5-40 SYRINGE (ML) INJECTION AS NEEDED
Status: DISCONTINUED | OUTPATIENT
Start: 2022-06-27 | End: 2022-06-27 | Stop reason: HOSPADM

## 2022-06-27 RX ORDER — BUPIVACAINE HYDROCHLORIDE AND EPINEPHRINE 5; 5 MG/ML; UG/ML
INJECTION, SOLUTION EPIDURAL; INTRACAUDAL; PERINEURAL AS NEEDED
Status: DISCONTINUED | OUTPATIENT
Start: 2022-06-27 | End: 2022-06-27 | Stop reason: HOSPADM

## 2022-06-27 RX ORDER — DIPHENHYDRAMINE HYDROCHLORIDE 50 MG/ML
12.5 INJECTION, SOLUTION INTRAMUSCULAR; INTRAVENOUS AS NEEDED
Status: DISCONTINUED | OUTPATIENT
Start: 2022-06-27 | End: 2022-06-27 | Stop reason: HOSPADM

## 2022-06-27 RX ORDER — HYDROMORPHONE HYDROCHLORIDE 2 MG/ML
INJECTION, SOLUTION INTRAMUSCULAR; INTRAVENOUS; SUBCUTANEOUS AS NEEDED
Status: DISCONTINUED | OUTPATIENT
Start: 2022-06-27 | End: 2022-06-27 | Stop reason: HOSPADM

## 2022-06-27 RX ORDER — SCOLOPAMINE TRANSDERMAL SYSTEM 1 MG/1
1 PATCH, EXTENDED RELEASE TRANSDERMAL ONCE
Status: DISCONTINUED | OUTPATIENT
Start: 2022-06-27 | End: 2022-06-27 | Stop reason: HOSPADM

## 2022-06-27 RX ORDER — MIDAZOLAM HYDROCHLORIDE 1 MG/ML
INJECTION, SOLUTION INTRAMUSCULAR; INTRAVENOUS AS NEEDED
Status: DISCONTINUED | OUTPATIENT
Start: 2022-06-27 | End: 2022-06-27 | Stop reason: HOSPADM

## 2022-06-27 RX ORDER — NEOSTIGMINE METHYLSULFATE 1 MG/ML
INJECTION, SOLUTION INTRAVENOUS AS NEEDED
Status: DISCONTINUED | OUTPATIENT
Start: 2022-06-27 | End: 2022-06-27 | Stop reason: HOSPADM

## 2022-06-27 RX ORDER — ROCURONIUM BROMIDE 10 MG/ML
INJECTION, SOLUTION INTRAVENOUS AS NEEDED
Status: DISCONTINUED | OUTPATIENT
Start: 2022-06-27 | End: 2022-06-27 | Stop reason: HOSPADM

## 2022-06-27 RX ORDER — FENTANYL CITRATE 50 UG/ML
25 INJECTION, SOLUTION INTRAMUSCULAR; INTRAVENOUS
Status: DISCONTINUED | OUTPATIENT
Start: 2022-06-27 | End: 2022-06-27 | Stop reason: HOSPADM

## 2022-06-27 RX ORDER — GLYCOPYRROLATE 0.2 MG/ML
INJECTION INTRAMUSCULAR; INTRAVENOUS AS NEEDED
Status: DISCONTINUED | OUTPATIENT
Start: 2022-06-27 | End: 2022-06-27 | Stop reason: HOSPADM

## 2022-06-27 RX ORDER — LIDOCAINE HYDROCHLORIDE 10 MG/ML
0.1 INJECTION, SOLUTION EPIDURAL; INFILTRATION; INTRACAUDAL; PERINEURAL AS NEEDED
Status: DISCONTINUED | OUTPATIENT
Start: 2022-06-27 | End: 2022-06-27 | Stop reason: HOSPADM

## 2022-06-27 RX ORDER — LIDOCAINE HYDROCHLORIDE 20 MG/ML
INJECTION, SOLUTION EPIDURAL; INFILTRATION; INTRACAUDAL; PERINEURAL AS NEEDED
Status: DISCONTINUED | OUTPATIENT
Start: 2022-06-27 | End: 2022-06-27 | Stop reason: HOSPADM

## 2022-06-27 RX ORDER — FLUMAZENIL 0.1 MG/ML
0.2 INJECTION INTRAVENOUS
Status: DISCONTINUED | OUTPATIENT
Start: 2022-06-27 | End: 2022-06-27 | Stop reason: HOSPADM

## 2022-06-27 RX ORDER — ONDANSETRON 2 MG/ML
INJECTION INTRAMUSCULAR; INTRAVENOUS AS NEEDED
Status: DISCONTINUED | OUTPATIENT
Start: 2022-06-27 | End: 2022-06-27 | Stop reason: HOSPADM

## 2022-06-27 RX ORDER — ONDANSETRON 2 MG/ML
4 INJECTION INTRAMUSCULAR; INTRAVENOUS AS NEEDED
Status: DISCONTINUED | OUTPATIENT
Start: 2022-06-27 | End: 2022-06-27 | Stop reason: HOSPADM

## 2022-06-27 RX ORDER — NALOXONE HYDROCHLORIDE 0.4 MG/ML
0.04 INJECTION, SOLUTION INTRAMUSCULAR; INTRAVENOUS; SUBCUTANEOUS
Status: DISCONTINUED | OUTPATIENT
Start: 2022-06-27 | End: 2022-06-27 | Stop reason: HOSPADM

## 2022-06-27 RX ORDER — PROPOFOL 10 MG/ML
INJECTION, EMULSION INTRAVENOUS
Status: DISCONTINUED | OUTPATIENT
Start: 2022-06-27 | End: 2022-06-27 | Stop reason: HOSPADM

## 2022-06-27 RX ORDER — AMOXICILLIN AND CLAVULANATE POTASSIUM 875; 125 MG/1; MG/1
1 TABLET, FILM COATED ORAL EVERY 12 HOURS
Qty: 20 TABLET | Refills: 0 | Status: SHIPPED | OUTPATIENT
Start: 2022-06-27

## 2022-06-27 RX ADMIN — ROCURONIUM BROMIDE 35 MG: 10 INJECTION INTRAVENOUS at 14:37

## 2022-06-27 RX ADMIN — CEFAZOLIN SODIUM 2 G: 1 POWDER, FOR SOLUTION INTRAMUSCULAR; INTRAVENOUS at 14:42

## 2022-06-27 RX ADMIN — HYDROMORPHONE HYDROCHLORIDE 0.5 MG: 2 INJECTION INTRAMUSCULAR; INTRAVENOUS; SUBCUTANEOUS at 14:53

## 2022-06-27 RX ADMIN — ONDANSETRON HYDROCHLORIDE 4 MG: 2 SOLUTION INTRAMUSCULAR; INTRAVENOUS at 15:29

## 2022-06-27 RX ADMIN — FENTANYL CITRATE 50 MCG: 50 INJECTION, SOLUTION INTRAMUSCULAR; INTRAVENOUS at 14:33

## 2022-06-27 RX ADMIN — DEXAMETHASONE SODIUM PHOSPHATE 4 MG: 4 INJECTION, SOLUTION INTRAMUSCULAR; INTRAVENOUS at 14:45

## 2022-06-27 RX ADMIN — ROCURONIUM BROMIDE 5 MG: 10 INJECTION INTRAVENOUS at 14:35

## 2022-06-27 RX ADMIN — ONDANSETRON 4 MG: 2 INJECTION INTRAMUSCULAR; INTRAVENOUS at 16:57

## 2022-06-27 RX ADMIN — SODIUM CHLORIDE, POTASSIUM CHLORIDE, SODIUM LACTATE AND CALCIUM CHLORIDE 125 ML/HR: 600; 310; 30; 20 INJECTION, SOLUTION INTRAVENOUS at 13:23

## 2022-06-27 RX ADMIN — PROPOFOL 250 MCG/KG/MIN: 10 INJECTION, EMULSION INTRAVENOUS at 14:38

## 2022-06-27 RX ADMIN — FENTANYL CITRATE 50 MCG: 50 INJECTION, SOLUTION INTRAMUSCULAR; INTRAVENOUS at 14:35

## 2022-06-27 RX ADMIN — LIDOCAINE HYDROCHLORIDE 60 MG: 20 INJECTION, SOLUTION EPIDURAL; INFILTRATION; INTRACAUDAL; PERINEURAL at 14:35

## 2022-06-27 RX ADMIN — MIDAZOLAM HYDROCHLORIDE 2 MG: 1 INJECTION, SOLUTION INTRAMUSCULAR; INTRAVENOUS at 14:24

## 2022-06-27 RX ADMIN — GLYCOPYRROLATE 0.2 MG: 0.2 INJECTION INTRAMUSCULAR; INTRAVENOUS at 15:49

## 2022-06-27 RX ADMIN — SODIUM CHLORIDE, POTASSIUM CHLORIDE, SODIUM LACTATE AND CALCIUM CHLORIDE: 600; 310; 30; 20 INJECTION, SOLUTION INTRAVENOUS at 15:47

## 2022-06-27 RX ADMIN — Medication 2 MG: at 15:49

## 2022-06-27 NOTE — ANESTHESIA POSTPROCEDURE EVALUATION
Procedure(s):  REVISION BILATERAL BREAST RECONSTRUCTION WITH POCKET REVISION AND FAT GRAFTING TO BILATERAL BREASTS.    total IV anesthesia    Anesthesia Post Evaluation      Multimodal analgesia: multimodal analgesia not used between 6 hours prior to anesthesia start to PACU discharge  Patient location during evaluation: PACU  Patient participation: complete - patient participated  Level of consciousness: awake  Pain management: adequate  Airway patency: patent  Anesthetic complications: no  Cardiovascular status: acceptable, blood pressure returned to baseline and hemodynamically stable  Respiratory status: acceptable  Hydration status: acceptable  Post anesthesia nausea and vomiting:  controlled  Final Post Anesthesia Temperature Assessment:  Normothermia (36.0-37.5 degrees C)      INITIAL Post-op Vital signs:   Vitals Value Taken Time   /72 06/27/22 1645   Temp 35.7 °C (96.3 °F) 06/27/22 1611   Pulse 83 06/27/22 1646   Resp 12 06/27/22 1646   SpO2 100 % 06/27/22 1646   Vitals shown include unvalidated device data.

## 2022-06-27 NOTE — H&P
Plastic Surgery PRE-OP Admission History and Physical    Patient: Jb Oliveira MRN: [id number]  SSN: [id number]    YOB: 1990  Age: 29 y.o. Sex: female            Subjective:   Patient is a 29 y.o.  female who presents for breast reconstruction with fat grafting, implant exchange. .  The patient was evaluated and determined the most appropriate plan of care is to proceed with surgical intervention. Patient denies chest pain, tightness, pain radiating down left arm, palpitations, dizziness, lightheadedness, fevers, chills. Patient denies shortness of breath, wheezing, diarrhea, constipation, abdominal pain. Patient denies urinary problems, dysuria, hesitancy, urgency. Denies skin breakdown, rashes, insect bites or open area. Patient Active Problem List    Diagnosis Date Noted    Family hx-breast malignancy 02/24/2022    Pregnancy 04/26/2016    Pregnant 10/21/2014     Past Medical History:   Diagnosis Date    PONV (postoperative nausea and vomiting)     PONV X2 surgeries    Unspecified adverse effect of anesthesia     \"takes more than usual to get me to sleep\", w/every surgery, not wisdom teeth sedation      Past Surgical History:   Procedure Laterality Date    HX BREAST RECONSTRUCTION Bilateral 4/4/2022    BILATERAL BREAST RECONSTRUCTION, DIRECT TO IMPLANT RECONSTRUCTION AND FLEX HD performed by Laurita Graham MD at [place] [id number] HX MASTECTOMY Bilateral 4/4/2022    BILATERAL BREAST PROPHYLACTIC MASTECTOMIES performed by Darryle Schlatter, MD at [place] [address] HX ORTHOPAEDIC Right 5/2006    ORIF, ankle with 2 screws place    HX ORTHOPAEDIC Right 2/2007    screws removed from ankle    HX ORTHOPAEDIC Left 6/2013    ORIF, ankle w/2 wires, 8 screws and 2 plates    HX WISDOM TEETH EXTRACTION  2010    MD LEG/ANKLE SURGERY PROC UNLISTED Left 2015    K wires removed and debrided ankle      Prior to Admission medications    Medication Sig Start Date End Date Taking? Authorizing Provider   norgestimate-ethinyl estradioL (Tri-Sprintec, 28,) 0.18/0.215/0.25 mg-35 mcg (28) tab Take 1 Tablet by mouth nightly. Yes Provider, Historical   acetaminophen (TYLENOL) 500 mg tablet Take 1,500 mg by mouth every six (6) hours as needed for Pain. Yes Provider, Historical     Current Facility-Administered Medications   Medication Dose Route Frequency    lidocaine (PF) (XYLOCAINE) 10 mg/mL (1 %) injection 0.1 mL  0.1 mL SubCUTAneous PRN    lactated Ringers infusion  125 mL/hr IntraVENous CONTINUOUS    sodium chloride (NS) flush 5-40 mL  5-40 mL IntraVENous Q8H    sodium chloride (NS) flush 5-40 mL  5-40 mL IntraVENous PRN    naloxone (NARCAN) injection 0.04 mg  0.04 mg IntraVENous Multiple    flumazeniL (ROMAZICON) 0.1 mg/mL injection 0.2 mg  0.2 mg IntraVENous Multiple    ceFAZolin (ANCEF) 2 g in sterile water (preservative free) 20 mL IV syringe  2 g IntraVENous ONCE    scopolamine (TRANSDERM-SCOP) 1 mg over 3 days 1 Patch  1 Patch TransDERmal ONCE      No Known Allergies   Social History     Tobacco Use    Smoking status: Never Smoker    Smokeless tobacco: Never Used   Substance Use Topics    Alcohol use: Yes     Alcohol/week: 1.0 standard drink     Types: 1 Glasses of wine per week     Comment: 3-4 per month, very occasional      Family History   Problem Relation Age of Onset    Breast Cancer Mother     No Known Problems Father     No Known Problems Brother     No Known Problems Son     No Known Problems Daughter     Breast Cancer Maternal Grandmother     Breast Cancer Maternal Aunt         Review of Systems  A comprehensive review of systems was negative except for that written in the HPI.         Objective:     Patient Vitals for the past 8 hrs:   BP Temp Pulse Resp SpO2 Height Weight   22 1312 132/86 98.8 °F (37.1 °C) 81 15 100 % -- --   22 1253 -- -- -- -- -- 5' 4\" (1.626 m) 58.7 kg (129 lb 6.6 oz)     Temp (24hrs), Av.8 °F (37.1 °C), Min:98.8 °F (37.1 °C), Max:98.8 °F (37.1 °C)      Gen: Well-developed,  in no acute distress   HEENT: Pink conjunctivae, PERRL, hearing intact to voice, moist mucous membranes   Neck: Supple, without masses, thyroid non-tender   Resp: No accessory muscle use,  breathing even/ nonlabored. Card: Regular rate and rhythm. Abd: Soft, non-tender, non-distended,   Lymph: No cervical or inguinal adenopathy   Musc: WNL  Skin: No skin breakdown noted. Skin warm, pink, dry. No rashes. Neuro: Cranial nerves are grossly intact, no focal motor weakness, follows commands appropriately   Psych: Good insight, oriented to person, place and time, alert      Labs:   Recent Results (from the past 24 hour(s))   HCG URINE, QL. - POC    Collection Time: 22  1:18 PM   Result Value Ref Range    Pregnancy test,urine (POC) Negative NEG         Assessment:     Patient Active Problem List    Diagnosis Date Noted    Family hx-breast malignancy 2022    Pregnancy 2016    Pregnant 10/21/2014         Plan:   Proceed with surgical intervention without contraindications. I met with pt. this morning and discussed increased ambulation to improve pulmonary status. We also discussed preop and postop expectations to include pain management. All questions were answered.         Sam Redding NP

## 2022-06-27 NOTE — ANESTHESIA PREPROCEDURE EVALUATION
Relevant Problems   No relevant active problems       Anesthetic History     PONV          Review of Systems / Medical History  Patient summary reviewed and nursing notes reviewed    Pulmonary  Within defined limits            Pertinent negatives: No recent URI     Neuro/Psych   Within defined limits           Cardiovascular  Within defined limits                Exercise tolerance: >4 METS     GI/Hepatic/Renal  Within defined limits           Pertinent negatives: No GERD   Endo/Other  Within defined limits           Other Findings              Physical Exam    Airway  Mallampati: II    Neck ROM: normal range of motion   Mouth opening: Normal     Cardiovascular    Rhythm: regular  Rate: normal         Dental  No notable dental hx       Pulmonary  Breath sounds clear to auscultation               Abdominal         Other Findings            Anesthetic Plan    ASA: 1  Anesthesia type: total IV anesthesia          Induction: Intravenous  Anesthetic plan and risks discussed with: Patient

## 2022-06-27 NOTE — BRIEF OP NOTE
Brief Postoperative Note    Patient: Kelle Olivarez  YOB: 1990  MRN: [id number]    Date of Procedure: 6/27/2022     Pre-Op Diagnosis: BREAST CANCER    Post-Op Diagnosis: Same as preoperative diagnosis. Procedure(s):  REVISION BILATERAL BREAST RECONSTRUCTION WITH POCKET REVISION AND FAT GRAFTING TO BILATERAL BREASTS    Surgeon(s):  Rob Sinha MD    Surgical Assistant: Nurse Practitioner: Hannah Reyes NP    Anesthesia: General     Estimated Blood Loss (mL): less than 781     Complications: None    Specimens: * No specimens in log *     Implants:   Implant Name Type Inv.  Item Serial No.  Lot No. LRB No. Used Action   SIENTRA OPUS SILICONE GEL BREAST IMPLANT   [id number] [employer] NA Left 1 Explanted   SIENTRA OPUS SILICONE GEL BREAST IMPLANT   [id number] [employer] NA Right 1 Explanted   IMPLANT BRST GEL 5.1 CM PROJCT 13.9  [employer]+ - [id number]  IMPLANT BRST GEL 5.1 CM PROJCT 13.9  [employer] [id number] [employer]_WD NA Right 1 Implanted   IMPLANT BRST GEL 5.2 CM PROJCT 14.1  [employer] - [id number]  IMPLANT BRST GEL 5.2 CM PROJCT 14.1  [employer] [id number] [employer]_WD NA Left 1 Implanted       Drains: * No LDAs found *    Findings: none    Electronically Signed by Darline Mcbride MD on 6/27/2022 at 3:49 PM

## 2022-06-27 NOTE — DISCHARGE INSTRUCTIONS
Breast Reconstruction with liposuction and fat grafting patient Instructions  Dr. Gregorio Cooper, NP      Activities  Immediatly after  surgery you will rest quietly for the first 48 hours. You will be able to walk around the house and perform light daily activities; however, during this time, it is not at all unusual for you to feel some pain, soreness and pressure in the chest area. This will gradually subside and Dr. Marla Bustamante will give you pain medication to relieve it. Be sure to lie on your back whenever you rest or sleep. Keep your head elevated for 72 hours after surgery as this will help with swelling. No heavy exercise or lifting for three weeks following surgery. This will allow the implants to remain in the proper position without movement. For the first three days following surgery you should try to restrict your arm movements. Move your arms slowly and avoid sudden jerky movements of the chest and breast area. Keep your arms as close to your body as possible. This allows the implants to remain in place. Dr. Marla Bustamante encourages walking immediately after surgery. This activity will greatly minimize the risk of blood clots in your leg veins. Bathing: You will then be allowed to shower two days after surgery. Wash yourself everywhere, except your incisions and steristrips. Do not submerge yourself in a bath, swimming pool, or whirlpool for two weeks. Under garments: The surgery bra that you have been put in should be worn constantly during the day and at night. You will be changed out of that surgery bra to a more comfortable bra in the office at your first post operative appointment. You will wear the sports bra for a total of two to three weeks after surgery. You may also wear a soft sports bra with light support instead of the surgery bra if preferred. Abdominal binder:  Wear this until your first post operative visit.  After this, you can switch to compression undergarments (such as spanx)      The maximum swelling occurs at about three days and then begins to dramatically improve. Mild bruising typically resolves within 14 days. Medications:      Dilaudid: or Percocet  this is a your pain medication. Take one to two tablets as needed every 3-4 hours. No NSAIDS (advil, ibuprofen 5 days after surgery. This will increase your bleeding risk. Tylenol: (over the counter) 650 mg every 4 hours as needed for mild pain. Be careful because percocet has tylenol in it. You can not exceed 4000 mg a day of tylenol. Zofran: this is a medication for nausea. Take this as needed for nausea every 6-8 hours. Make sure you drink plenty of fluids. Nausea usually resolves after the first 24 hours. Augmentin or Levaquin: this is your antibiotic. Take this medication completley until empty. Valium: this is for muscle relaxation. Your implant was placed beneath the pectoral muscle. This muscle can sometimes feel tight. Valium can help relax this muscle. Stool softeners: while taking narcotics, take a stool softener (over the counter) twice daily. Incease fluids, fiber. It is very important to keep your bowels regular while taking narcotics. Work: You should plan to be off work for 5-6 days, although this can vary from person to person. Do not expose your breasts to the sun for eight weeks after surgery. Follow up: Please present to Dr. Priya Vasquez office at your scheduled appointment made prior to surgery. If you do not have an appt, please call the office ASAP to make your first post op visit. We like to see you within one to two days after surgery.     Please notify Dr. Tram Pena if:   If your one breast becomes significantly larger than the other   If you develop significant bruising across the chest    If you experience a significant increase in pain in the breast after the pain has improved   If you develop a temperature above 101.5° F   If you develop redness (like a sunburn) around your incisions  If you need help or have any questions feel free to call Dr Angela Rosario with your concerns. Dr. Angela Rosario is on call 24 hours per day, seven days per week and has an answering service to forward calls. Breast Massage Following Breast Augmentation   Breast massage will be taught to you TWO weeks  from surgery. No massage is recommended before 2 weeks. The purpose of these exercises is to keep the scar tissue that forms around implants as soft as possible. By performing these exercises as described, you can help soften the internal scar, minimize the risk of significant capsular contracture and maximize the likelihood of a soft, natural feel and appearance to your breast.    Begin doing the exercises two weeks after surgery. Dr. Angela Rosario will show you the technique during your second post-operative visit. It is important to remember that slow steady massage is more effective than quick jerky movements. Don't worry about injuring the implant; you cannot cause a rupture with these exercises.  Press the breasts slowly and maximally inwards (towards your breast bone) and hold for 10 seconds, then release. Repeat four times.  Press the breasts apart slowly and maximally outwards and hold for 10 seconds, then release. Repeat four times.  Repeat for downward movement.  Repeat for upward movement.  Perform these exercises four times per day for the first three months. The quality of your cosmetic enhancement may be compromised if you fail to return for any scheduled post-op visits, or follow the pre- and post-operative instructions. Please dont hesitate to report any unusual or concerning changes. Our number is [phone].               DISCHARGE SUMMARY from your Nurse      PATIENT INSTRUCTIONS    After general anesthesia or intravenous sedation, for 24 hours or while taking prescription Narcotics:  · Limit your activities  · Do not drive and operate hazardous machinery  · Do not make important personal or business decisions  · Do  not drink alcoholic beverages  · If you have not urinated within 8 hours after discharge, please contact your surgeon on call. Report the following to your surgeon:  · Excessive pain, swelling, redness or odor of or around the surgical area  · Temperature over 100.5  · Nausea and vomiting lasting longer than 4 hours or if unable to take medications  · Any signs of decreased circulation or nerve impairment to extremity: change in color, persistent  numbness, tingling, coldness or increase pain  · Any questions      GOOD HELP TO FIGHT AN INFECTION  Here are a few tip to help reduce the chance of getting an infection after surgery:   Wash Your Hands   Good handwashing is the most important thing you and your caregiver can do.  Wash before and after caring for any wounds. Dry your hand with a clean towel.  Wash with soap and water for at least 20 seconds. A TIP: sing the \"Happy Birthday\" song through one time while washing to help with the timing.  Use a hand  in between washings.  Shower   When your surgeon says it is OK to take a shower, use a new bar of antibacterial soap (if that is what you use, and keep that bar of soap ONLY for your use), or antibacterial body wash.  Use a clean wash cloth or sponge when you bathe.  Dry off with a clean towel  after every bath - be careful around any wounds, skin staples, sutures or surgical glue over/on wounds.  Do not enter swimming pools, hot tubs, lakes, rivers and/or ocean until wounds are healed and your doctor/surgeon says it is OK.  Use Clean Sheets   Sleep on freshly laundered sheets after your surgery.  Keep the surgery site covered with a clean, dry bandage (if instructed to do so). If the bandage becomes soiled, reapply a new, dry, clean bandage.  Do not allow pets to sleep with you while your wound is healing.      Lifestyle Modification and Controlling Your Blood Sugar   Smoking slows wound healing. Stop smoking and limit exposure to second-hand smoke.  High blood sugar slows wound healing. Eat a well-balanced diet to provide proper nutrition while healing   Monitor your blood sugar (if you are a diabetic) and take your medications as you are suppose to so you can control you blood sugar after surgery. COUGH AND DEEP BREATHE    Breathing deeply and coughing are very important exercises to do after surgery. Deep breathing and coughing open the little air tubes and air sacks in your lungs. You take deep breaths every day. You may not even notice - it is just something you do when you sigh or yawn. It is a natural exercise you do to keep these air passages open. After surgery, take deep breaths and cough, on purpose. DIRECTIONS:  · Take 10 to 15 slow deep breaths every hour while awake. · Breathe in deeply, and hold it for 2 seconds. · Exhale slowly through puckered lips, like blowing up a balloon. · After every 4th or 5th deep breath, hug your pillow to your chest or belly and give a hard, deep cough. Yes, it will probably hurt. But doing this exercise is a very important part of healing after surgery. Take your pain medicine to help you do this exercise without too much pain. Coughing and deep breathing help prevent bronchitis and pneumonia after surgery. If you had chest or belly surgery, use a pillow as a \"hug temitope\" and hold it tightly to your chest or belly when you cough. ANKLE PUMPS    Ankle pumps increase the circulation of oxygenated blood to your lower extremities and decrease your risk for circulation problems such as blood clots. They also stretch the muscles, tendons and ligaments in your foot and ankle, and prevent joint contracture in the ankle and foot, especially after surgeries on the legs.     It is important to do ankle pump exercises regularly after surgery because immobility increases your risk for developing a blood clot. Your doctor may also have you take an Aspirin for the next few days as well. If your doctor did not ask you to take an Aspirin, consult with him before starting Aspirin therapy on your own. The exercise is quite simple. · Slowly point your foot forward, feeling the muscles on the top of your lower leg stretch, and hold this position for 5 seconds. · Next, pull your foot back toward you as far as possible, stretching the calf muscles, and hold that position for 5 seconds. · Repeat with the other foot. · Perform 10 repetitions every hour while awake for both ankles if possible (down and then up with the foot once is one repetition). You should feel gentle stretching of the muscles in your lower leg when doing this exercise. If you feel pain, or your range of motion is limited, don't push too hard. Only go the limit your joint and muscles will let you go. If you have increasing pain, progressively worsening leg warmth or swelling, STOP the exercise and call your doctor. MEDICATION AND   SIDE EFFECT GUIDE    The [facility] MEDICATION AND SIDE EFFECT GUIDE was provided to the PATIENT AND CARE PROVIDER. Information provided includes instruction about drug purpose and common side effects for the following medications:   · See provider instructions        These are general instructions for a healthy lifestyle:    *   Please give a list of your current medications to your Primary Care Provider. *   Please update this list whenever your medications are discontinued, doses are changed, or new medications (including over-the-counter products) are added. *   Please carry medication information at all times in case of emergency situations. About Smoking  No smoking / No tobacco products  Avoid exposure to second hand smoke     Surgeon General's Warning:  Quitting smoking now greatly reduces serious risk to your health.     Obesity, smoking, and sedentary lifestyle greatly increases your risk for illness and disease. A healthy diet, regular physical exercise & weight monitoring are important for maintaining a healthy lifestyle. Congestive Heart Failure  You may be retaining fluid if you have a history of heart failure or if you experience any of the following symptoms:  Weight gain of 3 pounds or more overnight or 5 pounds in a week, increased swelling in your hands or feet or shortness of breath while lying flat in bed. Please call your doctor as soon as you notice any of these symptoms; do not wait until your next office visit. Recognize signs and symptoms of STROKE:  F -  Face looks uneven  A -  Arms unable to move or move evenly  S -  Speech slurred or non-existent  T -  Time-call 911 as soon as signs and symptoms begin-DO NOT go          back to bed or wait to see if you get better-TIME IS BRAIN. Warning Signs of HEART ATTACK   Call 911 if you have these symptoms:     Chest discomfort. Most heart attacks involve discomfort in the center of the chest that lasts more than a few minutes, or that goes away and comes back. It can feel like uncomfortable pressure, squeezing, fullness, or pain.  Discomfort in other areas of the upper body. Symptoms can include pain or discomfort in one or both arms, the back, neck, jaw, or stomach.  Shortness of breath with or without chest discomfort.  Other signs may include breaking out in a cold sweat, nausea, or lightheadedness. Don't wait more than five minutes to call 911 - MINUTES MATTER! Fast action can save your life. Calling 911 is almost always the fastest way to get lifesaving treatment. Emergency Medical Services staff can begin treatment when they arrive -- up to an hour sooner than if someone gets to the hospital by car. Learning About Coronavirus [phone])  Coronavirus [phone]): Overview  What is coronavirus (COVID-19)?   The coronavirus disease (COVID-19) is caused by a virus. It is an illness that was first found in [place], [place], in December 2019. It has since spread worldwide. The virus can cause fever, cough, and trouble breathing. In severe cases, it can cause pneumonia and make it hard to breathe without help. It can cause death. Coronaviruses are a large group of viruses. They cause the common cold. They also cause more serious illnesses like Middle East respiratory syndrome (MERS) and severe acute respiratory syndrome (SARS). COVID-19 is caused by a novel coronavirus. That means it's a new type that has not been seen in people before. This virus spreads person-to-person through droplets from coughing and sneezing. It can also spread when you are close to someone who is infected. And it can spread when you touch something that has the virus on it, such as a doorknob or a tabletop. What can you do to protect yourself from coronavirus (COVID-19)? The best way to protect yourself from getting sick is to:  · Avoid areas where there is an outbreak. · Avoid contact with people who may be infected. · Wash your hands often with soap or alcohol-based hand sanitizers. · Avoid crowds and try to stay at least 6 feet away from other people. · Wash your hands often, especially after you cough or sneeze. Use soap and water, and scrub for at least 20 seconds. If soap and water aren't available, use an alcohol-based hand . · Avoid touching your mouth, nose, and eyes. What can you do to avoid spreading the virus to others? To help avoid spreading the virus to others:  · Cover your mouth with a tissue when you cough or sneeze. Then throw the tissue in the trash. · Use a disinfectant to clean things that you touch often. · Stay home if you are sick or have been exposed to the virus. Don't go to school, work, or public areas. And don't use public transportation. · If you are sick:  ? Leave your home only if you need to get medical care.  But call the doctor's office first so they know you're coming. And wear a face mask, if you have one.  ? If you have a face mask, wear it whenever you're around other people. It can help stop the spread of the virus when you cough or sneeze. ? Clean and disinfect your home every day. Use household  and disinfectant wipes or sprays. Take special care to clean things that you grab with your hands. These include doorknobs, remote controls, phones, and handles on your refrigerator and microwave. And don't forget countertops, tabletops, bathrooms, and computer keyboards. When to call for help  Call 911 anytime you think you may need emergency care. For example, call if:  · You have severe trouble breathing. (You can't talk at all.)  · You have constant chest pain or pressure. · You are severely dizzy or lightheaded. · You are confused or can't think clearly. · Your face and lips have a blue color. · You pass out (lose consciousness) or are very hard to wake up. Call your doctor now if you develop symptoms such as:  · Shortness of breath. · Fever. · Cough. If you need to get care, call ahead to the doctor's office for instructions before you go. Make sure you wear a face mask, if you have one, to prevent exposing other people to the virus. Where can you get the latest information? The following health organizations are tracking and studying this virus. Their websites contain the most up-to-date information. Ashley Lewis also learn what to do if you think you may have been exposed to the virus. · U.[place]. Centers for Disease Control and Prevention (CDC): The CDC provides updated news about the disease and travel advice. The website also tells you how to prevent the spread of infection. www.cdc.gov  · World Health Organization [facility]): WHO offers information about the virus outbreaks. WHO also has travel advice. www.who.int  Current as of: April 1, 2020               Content Version: 12.4  © 8332-6957 [employer].    Care instructions adapted under license by your healthcare professional. If you have questions about a medical condition or this instruction, always ask your healthcare professional. [place] [address] any warranty or liability for your use of this information. The discharge information has been reviewed with the patient and caregiver. Any questions and concerns from the patient and caregiver have been addressed. The patient and caregiver verbalized understanding. CONTENTS FOUND IN YOUR DISCHARGE ENVELOPE:  [x]     Surgeon and Hospital Discharge Instructions  []     [facility] Care Provider Card  [x]     Medication & Side Effect Guide            (your newly prescribed medications have been marked/highlighted showing the most common side effects from   the classes of drugs on your prescriptions)   []     Medication Prescription(s) x amoxicillin-clavulanate (AUGMENTIN)    ondansetron (ZOFRAN ODT)        ( [] These have been sent electronically to your pharmacy by your surgeon,   - OR -       your surgeon has already provided these to you during a previous/pre-op office visit)  []     [address]  []     Physical Therapy Prescription  []     Follow-up Appointment Cards  []     Surgery-related Pictures/Media  []     Pain block and/or block with On-Q Catheter from Anesthesia Service (information included in your instructions above)  []     Medical device information sheets/pamphlets from their    []     School/work excuse note. []     /parent work excuse note. The following personal items collected during your admission are returned to you:   Dental Appliance: Dental Appliances: None  Vision: Visual Aid: None  Hearing Aid:    Jewelry: Jewelry: None  Clothing: Clothing: Other (comment) (Street clothes  and cell phone in locker)  Other Valuables: Other Valuables: Cell Phone,Purse (Pt wishes for purse and cell phone to remain with clothing. Declined to send to security.)  Valuables sent to safe:

## 2022-06-30 NOTE — OP NOTES
Tomasz Benites [facility] 81  OPERATIVE REPORT    Name:  Luna Morris  MR#:  [id number]  :  1990  ACCOUNT #:  [de-identified]  DATE OF SERVICE:  2022    PREOPERATIVE DIAGNOSES:  1. Strong family history of breast cancer. 2.  Genetic propensity to breast cancer. 3.  Surgical absence, bilateral breasts. 4.  Breast asymmetry. 5.  Capsule contracture, bilateral breasts. POSTOPERATIVE DIAGNOSES:  1. Strong family history of breast cancer. 2.  Genetic propensity to breast cancer. 3.  Surgical absence, bilateral breasts. 4.  Breast asymmetry. 5.  Capsule contracture, bilateral breasts. PROCEDURES PERFORMED:  1. Reconstruction of right and left breasts with removal of silicone breast prosthesis. 2.  Reconstruction of right and left breasts with extensive open capsulotomy and capsulectomy. 3.  Reconstruction of right and left breasts with revision. 4.  Reconstruction of right and left breasts with delayed insertion of silicone breast prosthesis. 5.  Reconstruction of right and left breasts with autologous tissue fat transfer, 442 mL total.    SURGEON:  Paty Liao MD    ASSISTANT:  Kervin Coe NP. Due to the complexity of this procedure, surgical assistance was required. HORTENSIA Garcia assisted with the removal of silicone breast prosthesis, extensive open capsulotomy and capsulectomy, placement of new silicone breast prosthesis, harvesting and placement of fat grafts, and complex wound closure. ANESTHESIA:  General endotracheal.    COMPLICATIONS:  None. SPECIMENS REMOVED:  1. Total aspirate liposuction 500 mL. 2.  Excised capsule, right breast.  3.  Excised capsule, left breast.    IMPLANTS:  See note. ESTIMATED BLOOD LOSS:  100 mL    DRAINS:  None. DISPOSITION:  Stable to PACU. COUNTS:  Correct x2.     BRIEF HISTORY:  The patient is a 27-year-old female with a genetic propensity to breast cancer who underwent previous bilateral total skin and nipple-areolar sparing mastectomies with immediate direct implant reconstruction using ACDM and silicone breast prosthesis. She now presents for revision. The overall procedure, incisional approach, expected outcome and recovery were discussed. The risks, benefits and alternatives to the procedure including but not limited to bleeding, infection, damage to surrounding tissue structures, the need for revisional surgery, seroma, hematoma, capsule contracture, implant rupture, implant deflation, the need for future implant maintenance and surveillance MRIs, partial or total loss of sensation to the skin, skin flap necrosis, atrophy of fat grafts, oil cysts, calcium deposits, PE, DVT, and fat embolism were discussed. Postoperative cup size cannot be guaranteed and some residual postoperative asymmetry is to be expected. She agreed to proceed. PROCEDURE:  Preoperative markings were made with the patient in the standing position. Informed consent was obtained. The patient was taken to the operating room and placed supine on the operating table. Sequential compression boots were placed. General endotracheal anesthesia was obtained. A lower body Juan J Hugger was placed. The chest and abdominal wall were prepped and draped in usual sterile fashion. Two lower abdominal wall liposuction port sites were made sharply. The anterior abdominal wall and flanks were infused with 1300 mL of standard tumescent solution consisting of 1 liter of warm lactated Ringer's mixed with 1 ampule of epinephrine and 10 mL of 2% lidocaine plain using a Lamis infiltrating cannula. After 7 minutes, tumescent liposuction was performed with a 4.0-mm blunt Mercedes tip flared cannula in the deep and intermediate planes. Cross tunneling was performed at all times and the adequacy of resection was confirmed with the deep palpation and pinch test.  A total of 500 mL of lipoaspirate was removed.   The lipoaspirate was captured directly into a sterile trap on the operating field. The supernatant was decanted and the grafts were loaded into 60-mL syringes for later grafting. The port sites were closed with 5-0 plain gut suture. Attention was then turned to the right breast.  The previous extended inframammary incision was then reopened sharply. Dissection was carried down through the subcutaneous tissue to the level of the anterior capsule. The anterior capsule was then opened. The breast pocket was entered. The previous breast implant was removed and appeared to be in good condition without rupture. Using a lighted retractor, the breast pocket was examined. The acellular dermal matrix was completely incorporated. Superior and medial capsulotomies were performed to improve upper and medial pole contour and strip anterior capsulectomy was likewise performed. Hemostasis was secured. Multiple silicone sizers were placed and a smooth round moderate plus profile silicone implant, 385 mL, was chosen. The pocket was then irrigated with 500 mL of half-strength Betadine solution, 2 liters of triple antibiotic solution and 10 mL of 0.5% Marcaine with epinephrine. Gloves were changed. A Sientra smooth round moderate plus profile silicone implant, 477 mL, serial number [id number], was presoaked in triple antibiotic solution. Gloves were changed. The implant was placed in the right breast pocket via a Warner funnel using the no-touch technique. Orientation of the implant was confirmed. The breast wound was then closed with running 2-0 PDS suture on the anterior capsule and subcutaneous tissue and a running deep dermal and subcuticular 3-0 Monocryl. The exact same procedure was repeated on the left side with a 575-mL implant, serial number [id number]. The breast was closed likewise. Autologous fat was then performed. Multiple percutaneous port sites were created with an 11-blade.   Fat was then transferred to each breast in the upper pole, upper medial pole and anterior surface using a type 2 Olson cannula in the deep dermal and subcutaneous planes. The contour improvement appeared satisfactory. The port sites were closed with 5-0 plain gut suture. The wounds were then dressed with Steri-Strips, 4 x 4s, Medipore tape. A surgical bra and abdominal binder were placed. Anesthesia was then discontinued. The patient extubated in the operating room having tolerated the procedure well. The needle, instrument and laparotomy pad counts at the end of the case were correct.       Ayla Gurrola MD      [id number]  D:  06/30/2022 10:19  T:  06/30/2022 18:12  JOB #:  [id number]

## 2022-11-03 NOTE — MR AVS SNAPSHOT
Visit Information Date & Time Provider Department Dept. Phone Encounter #  
 5/11/2017  2:30 PM Cecilia Armstrong MD [facility] [id number] [id number] Upcoming Health Maintenance Date Due  
 HPV AGE 9Y-34Y (1 of 3 - Female 3 Dose Series) 4/26/2003 DTaP/Tdap/Td series (1 - Tdap) 4/26/2013 PAP AKA CERVICAL CYTOLOGY 4/26/2013 INFLUENZA AGE 9 TO ADULT 8/1/2017 Allergies as of 5/11/2017  Review Complete On: 5/11/2017 By: Karuna Jaramillo RN No Known Allergies Current Immunizations  Never Reviewed No immunizations on file. Not reviewed this visit Vitals BP Pulse Height(growth percentile) Weight(growth percentile) LMP Breastfeeding? 111/77 83 5' 3\" (1.6 m) 119 lb (54 kg) 04/17/2017 No  
 BMI OB Status Smoking Status 21.08 kg/m2 Having regular periods Never Smoker Vitals History BMI and BSA Data Body Mass Index Body Surface Area 21.08 kg/m 2 1.55 m 2 Your Updated Medication List  
  
   
This list is accurate as of: 5/11/17  3:44 PM.  Always use your most recent med list.  
  
  
  
  
 TRI-LO-SPRINTEC 0.18/0.215/0.25 mg-25 mcg Tab Generic drug:  norgestimate-ethinyl estradiol TAKE ONE TABLET BY MOUTH EVERY DAY Patient Instructions Breast Cancer (BRCA) Gene Testing: Care Instructions Your Care Instructions BRCA1 and BRCA2 are genes that help control normal cell growth. Sometimes, people inherit changes in one of these genes. These changes are called mutations. If you inherit a BRCA (say \"BRAH-kuh\") mutation, you have a greater risk of breast or ovarian cancer. You also have a higher risk of breast or ovarian cancer if you have a family history of them. Some people have a family history, but they don't have the BRCA mutation. To find out if you have the BRCA mutation, you can have a blood test. People who have the mutation have a higher risk for these cancers.  But not everyone with the mutation gets cancer. Talk to your doctor about things that may increase your risk. Let your doctor know if you or a family member had or has: · A positive test for BRCA. · Breast cancer before age 48. · Ovarian cancer at any age. · Male breast cancer. · Breast cancer in both breasts. · Both breast and ovarian cancer. · [place]. Your doctor may also want you to talk with a genetic counselor. The counselor can help you understand what the results of this test might mean. Follow-up care is a key part of your treatment and safety. Be sure to make and go to all appointments, and call your doctor if you are having problems. It's also a good idea to know your test results and keep a list of the medicines you take. Why should you have BRCA testing? You may feel better if the test shows that you don't have a BRCA mutation. This is called a negative result. If the test shows that you do have a BRCA mutation, it's called a positive result. In this case, you may be able to make some decisions that could reduce your cancer risk. The information may also be helpful to your family and loved ones. What are the risks of BRCA testing? A negative test may give you a false sense of security. So you may not have the regular tests that help find cancer at an early stage. But a negative BRCA test does not mean that you will never have breast or ovarian cancer. A positive test result may cause anxiety or depression. A positive BRCA test does not mean that you will definitely get breast or ovarian cancer. It's important to think carefully about what the test results could mean for you. A genetic counselor can help you do this. What can you do to reduce the risk of breast cancer? Your risk for breast cancer increases as you get older. There is no known way to prevent breast cancer. But with some cancers, finding them early can increase your chances of successful treatment. Here are some steps you can take to help reduce your risk: 
· Get familiar with the look and feel of your breasts. This will help you notice any changes. Call your doctor if you notice a change. · Have regular breast exams by your doctor or nurse. Ask your doctor how often you should get them. · Have regular mammograms. A mammogram is a picture of your breast tissue. It can find changes in your breast before you can feel them. Talk to your doctor about when to get this test. 
You can also help take care of yourself and reduce your risk of cancer if you: 
· Stay at a healthy weight. · Eat a healthy, low-fat diet. · Get some exercise every day. If you don't usually exercise, walking is a good way to start. · Don't smoke. If you need help quitting, talk to your doctor about stop-smoking programs and medicines. These can increase your chances of quitting for good. · Drink alcohol in moderation. Or don't drink alcohol at all. · Breastfeed. There is some evidence that breastfeeding may lower the risk of breast cancer. The benefit seems to be greatest in women who have  for longer than 12 months or who  several children. Men and women who do a gene test and find out that they have a BRCA gene change have some options to manage their cancer risk. · Women who haven't had cancer may want to think about starting breast cancer screening at a younger age, taking medicine, and having preventive surgery. · Men may want to think about doing breast self-exams, having clinical breast exams, and having prostate cancer screening. If you have a BRCA gene change, talk with your doctor. He or she will help you manage your cancer risk. Where can you learn more? Go to http://jordan-bishop.info/. Enter U252 in the search box to learn more about \"Breast Cancer (BRCA) Gene Testing: Care Instructions. \" Current as of: August 31, 2016 Content Version: 11.2 © 9695-5341 Healthwise, Incorporated. Care instructions adapted under license by [employer] (which disclaims liability or warranty for this information). If you have questions about a medical condition or this instruction, always ask your healthcare professional. [address] any warranty or liability for your use of this information. Introducing [facility]! Dear Althea Finley: Thank you for requesting a [email or web address] account. Our records indicate that you already have an active [email or web address] account. You can access your account anytime at [email or web address] Did you know that you can access your hospital and ER discharge instructions at any time in [email or web address]? You can also review all of your test results from your hospital stay or ER visit. Additional Information If you have questions, please visit the Frequently Asked Questions section of the [email or web address] website at [email or web address] Remember, [email or web address] is NOT to be used for urgent needs. For medical emergencies, dial 911. Now available from your iPhone and Android! Please provide this summary of care documentation to your next provider. Your primary care clinician is listed as Daniele Holt. If you have any questions after today's visit, please call [phone]. Olumiant Pregnancy And Lactation Text: Based on animal studies, Olumiant may cause embryo-fetal harm when administered to pregnant women.  The medication should not be used in pregnancy.  Breastfeeding is not recommended during treatment.

## 2022-11-21 RX ORDER — AMOXICILLIN AND CLAVULANATE POTASSIUM 875; 125 MG/1; MG/1
1 TABLET, FILM COATED ORAL 2 TIMES DAILY
COMMUNITY

## 2022-11-21 NOTE — PERIOP NOTES
[address] [facility] [address], [address] [facility]   MAIN OR                                  [phone]   MAIN PRE OP                          [phone]                                                                                AMBULATORY PRE OP          [phone]  PRE-ADMISSION TESTING    [phone]   Surgery Date: Wednesday November 23, 2022       Is surgery arrival time given by surgeon? YES  NO  If NO, [facility] staff will call you between 3 and 7pm the day before your surgery with your arrival time. (If your surgery is on a Monday, we will call you the Friday before.)    Call [phone] after 7pm Monday-Friday if you did not receive this call. INSTRUCTIONS BEFORE YOUR SURGERY   When You  Arrive Arrive at the [address] [facility] on the day of your surgery  Have your insurance card, photo ID, and any copayment (if needed)   Food   and   Drink NO food or drink after midnight the night before surgery    This means NO water, gum, mints, coffee, juice, etc.  No alcohol (beer, wine, liquor) 24 hours before and after surgery   Medications to   TAKE   Morning of Surgery MEDICATIONS TO TAKE THE MORNING OF SURGERY WITH A SIP OF WATER:   None  Do not take Amoxicillin on the morning of surgery. Medications  To  STOP      7 days before surgery Non-Steroidal anti-inflammatory Drugs (NSAID's): for example, Ibuprofen (Advil, Motrin), Naproxen (Aleve)  Aspirin, if taking for pain   Herbal supplements, vitamins, and fish oil  (Pain medications not listed above, including Tylenol may be taken)   Blood  Thinners Not applicable.    Bathing Clothing  Jewelry  Valuables     If you shower the morning of surgery, please do not apply anything to your skin (lotions, powders, deodorant, or makeup, especially mascara)  Do not shave or trim anywhere 24 hours before surgery  Wear your hair loose or down; no pony-tails, buns, or metal hair clips  Wear loose, comfortable, clean clothes  Wear glasses instead of contacts  Leave money, valuables, and jewelry, including body piercings, at home     Going Home - or Spending the Night SAME-DAY SURGERY: You must have a responsible adult drive you home and stay with you 24 hours after surgery  ADMITS: If your doctor is keeping you in the hospital after surgery, leave personal belongings/luggage in your car until you have a hospital room number. Hospital discharge time is 12 noon  Drivers must be here before 12 noon unless you are told differently   Special Instructions Free  parking Monday-Friday 7a-5p at the main front entrance. Follow all instructions so your surgery wont be cancelled. Please, be on time. If a situation occurs and you are delayed the day of surgery, call [phone]. If your physical condition changes (like a fever, cold, flu, etc.) call your surgeon. Home medication(s) reviewed and verified via   PHONE     during PAT appointment. The patient was contacted by  PHONE      The patient verbalizes understanding of all instructions and  DOES NOT   need reinforcement.

## 2022-11-22 ENCOUNTER — ANESTHESIA EVENT (OUTPATIENT)
Dept: SURGERY | Age: 32
End: 2022-11-22
Payer: COMMERCIAL

## 2022-11-23 ENCOUNTER — ANESTHESIA (OUTPATIENT)
Dept: SURGERY | Age: 32
End: 2022-11-23
Payer: COMMERCIAL

## 2022-11-23 ENCOUNTER — HOSPITAL ENCOUNTER (OUTPATIENT)
Age: 32
Setting detail: OUTPATIENT SURGERY
Discharge: HOME | End: 2022-11-23
Attending: SURGERY | Admitting: SURGERY
Payer: COMMERCIAL

## 2022-11-23 VITALS
TEMPERATURE: 98 F | SYSTOLIC BLOOD PRESSURE: 106 MMHG | HEIGHT: 64 IN | WEIGHT: 120 LBS | BODY MASS INDEX: 20.49 KG/M2 | HEART RATE: 67 BPM | RESPIRATION RATE: 11 BRPM | DIASTOLIC BLOOD PRESSURE: 59 MMHG | OXYGEN SATURATION: 100 %

## 2022-11-23 LAB — HCG UR QL: NEGATIVE

## 2022-11-23 PROCEDURE — 77030036554: Performed by: SURGERY

## 2022-11-23 PROCEDURE — 77030011264 HC ELECTRD BLD EXT COVD -A: Performed by: SURGERY

## 2022-11-23 PROCEDURE — 74011250636 HC RX REV CODE- 250/636: Performed by: SURGERY

## 2022-11-23 PROCEDURE — 77030008463 HC STPLR SKN PROX J&J -B: Performed by: SURGERY

## 2022-11-23 PROCEDURE — 76210000021 HC REC RM PH II 0.5 TO 1 HR: Performed by: SURGERY

## 2022-11-23 PROCEDURE — 76060000036 HC ANESTHESIA 2.5 TO 3 HR: Performed by: SURGERY

## 2022-11-23 PROCEDURE — 77030040361 HC SLV COMPR DVT MDII -B

## 2022-11-23 PROCEDURE — 81025 URINE PREGNANCY TEST: CPT

## 2022-11-23 PROCEDURE — 74011250636 HC RX REV CODE- 250/636: Performed by: ANESTHESIOLOGY

## 2022-11-23 PROCEDURE — 77030011825 HC SUPP SURG PSTOP S2SG -B: Performed by: SURGERY

## 2022-11-23 PROCEDURE — 77030002974 HC SUT PLN J&J -A: Performed by: SURGERY

## 2022-11-23 PROCEDURE — 77030008534 HC TBNG LIPOSUC BYRO -B: Performed by: SURGERY

## 2022-11-23 PROCEDURE — 77030008684 HC TU ET CUF COVD -B: Performed by: ANESTHESIOLOGY

## 2022-11-23 PROCEDURE — 74011000250 HC RX REV CODE- 250: Performed by: SURGERY

## 2022-11-23 PROCEDURE — 77030012407 HC DRN WND BARD -B: Performed by: SURGERY

## 2022-11-23 PROCEDURE — 76010000132 HC OR TIME 2.5 TO 3 HR: Performed by: SURGERY

## 2022-11-23 PROCEDURE — 77030040506 HC DRN WND MDII -A: Performed by: SURGERY

## 2022-11-23 PROCEDURE — 77030026438 HC STYL ET INTUB CARD -A: Performed by: ANESTHESIOLOGY

## 2022-11-23 PROCEDURE — 74011000250 HC RX REV CODE- 250: Performed by: ANESTHESIOLOGY

## 2022-11-23 PROCEDURE — 2709999900 HC NON-CHARGEABLE SUPPLY: Performed by: SURGERY

## 2022-11-23 PROCEDURE — 77030026227 HC FUNL KELLR 2 PCH ALGN -C: Performed by: SURGERY

## 2022-11-23 PROCEDURE — 77030002916 HC SUT ETHLN J&J -A: Performed by: SURGERY

## 2022-11-23 PROCEDURE — 77030019908 HC STETH ESOPH SIMS -A: Performed by: ANESTHESIOLOGY

## 2022-11-23 PROCEDURE — 77030020061 HC IV BLD WRMR ADMIN SET 3M -B: Performed by: ANESTHESIOLOGY

## 2022-11-23 PROCEDURE — 77030002966 HC SUT PDS J&J -A: Performed by: SURGERY

## 2022-11-23 PROCEDURE — 77030013358: Performed by: SURGERY

## 2022-11-23 PROCEDURE — 77030013079 HC BLNKT BAIR HGGR 3M -A: Performed by: ANESTHESIOLOGY

## 2022-11-23 PROCEDURE — 76210000063 HC OR PH I REC FIRST 0.5 HR: Performed by: SURGERY

## 2022-11-23 PROCEDURE — 74011000258 HC RX REV CODE- 258: Performed by: SURGERY

## 2022-11-23 PROCEDURE — 74011250637 HC RX REV CODE- 250/637: Performed by: ANESTHESIOLOGY

## 2022-11-23 PROCEDURE — 77030002933 HC SUT MCRYL J&J -A: Performed by: SURGERY

## 2022-11-23 PROCEDURE — 77030008526 HC TBNG ASPIR DISP MCRA -B: Performed by: SURGERY

## 2022-11-23 PROCEDURE — 77030019940 HC BLNKT HYPOTHRM STRY -B: Performed by: SURGERY

## 2022-11-23 PROCEDURE — C1789 PROSTHESIS, BREAST, IMP: HCPCS | Performed by: SURGERY

## 2022-11-23 PROCEDURE — 88305 TISSUE EXAM BY PATHOLOGIST: CPT

## 2022-11-23 PROCEDURE — 77030040922 HC BLNKT HYPOTHRM STRY -A

## 2022-11-23 DEVICE — GRAFT HUM TISS L W13XL22CM THK0.7-1.4MM THN ACELLULAR: Type: IMPLANTABLE DEVICE | Site: BREAST | Status: FUNCTIONAL

## 2022-11-23 DEVICE — IMPLANTABLE DEVICE: Type: IMPLANTABLE DEVICE | Site: BREAST | Status: FUNCTIONAL

## 2022-11-23 RX ORDER — DEXAMETHASONE SODIUM PHOSPHATE 4 MG/ML
INJECTION, SOLUTION INTRA-ARTICULAR; INTRALESIONAL; INTRAMUSCULAR; INTRAVENOUS; SOFT TISSUE AS NEEDED
Status: DISCONTINUED | OUTPATIENT
Start: 2022-11-23 | End: 2022-11-23 | Stop reason: HOSPADM

## 2022-11-23 RX ORDER — NALOXONE HYDROCHLORIDE 0.4 MG/ML
0.04 INJECTION, SOLUTION INTRAMUSCULAR; INTRAVENOUS; SUBCUTANEOUS
Status: DISCONTINUED | OUTPATIENT
Start: 2022-11-23 | End: 2022-11-23 | Stop reason: HOSPADM

## 2022-11-23 RX ORDER — NEOSTIGMINE METHYLSULFATE 1 MG/ML
INJECTION, SOLUTION INTRAVENOUS AS NEEDED
Status: DISCONTINUED | OUTPATIENT
Start: 2022-11-23 | End: 2022-11-23 | Stop reason: HOSPADM

## 2022-11-23 RX ORDER — PROPOFOL 10 MG/ML
INJECTION, EMULSION INTRAVENOUS AS NEEDED
Status: DISCONTINUED | OUTPATIENT
Start: 2022-11-23 | End: 2022-11-23 | Stop reason: HOSPADM

## 2022-11-23 RX ORDER — ALBUTEROL SULFATE 0.83 MG/ML
2.5 SOLUTION RESPIRATORY (INHALATION) AS NEEDED
Status: DISCONTINUED | OUTPATIENT
Start: 2022-11-23 | End: 2022-11-23 | Stop reason: HOSPADM

## 2022-11-23 RX ORDER — PROPOFOL 10 MG/ML
INJECTION, EMULSION INTRAVENOUS
Status: DISCONTINUED | OUTPATIENT
Start: 2022-11-23 | End: 2022-11-23 | Stop reason: HOSPADM

## 2022-11-23 RX ORDER — SODIUM CHLORIDE 0.9 % (FLUSH) 0.9 %
5-40 SYRINGE (ML) INJECTION AS NEEDED
Status: DISCONTINUED | OUTPATIENT
Start: 2022-11-23 | End: 2022-11-23 | Stop reason: HOSPADM

## 2022-11-23 RX ORDER — SODIUM CHLORIDE 0.9 % (FLUSH) 0.9 %
5-40 SYRINGE (ML) INJECTION EVERY 8 HOURS
Status: DISCONTINUED | OUTPATIENT
Start: 2022-11-23 | End: 2022-11-23 | Stop reason: HOSPADM

## 2022-11-23 RX ORDER — FLUMAZENIL 0.1 MG/ML
0.2 INJECTION INTRAVENOUS
Status: DISCONTINUED | OUTPATIENT
Start: 2022-11-23 | End: 2022-11-23 | Stop reason: HOSPADM

## 2022-11-23 RX ORDER — BUPIVACAINE HYDROCHLORIDE AND EPINEPHRINE 5; 5 MG/ML; UG/ML
INJECTION, SOLUTION EPIDURAL; INTRACAUDAL; PERINEURAL AS NEEDED
Status: DISCONTINUED | OUTPATIENT
Start: 2022-11-23 | End: 2022-11-23 | Stop reason: HOSPADM

## 2022-11-23 RX ORDER — LIDOCAINE HYDROCHLORIDE 20 MG/ML
INJECTION, SOLUTION EPIDURAL; INFILTRATION; INTRACAUDAL; PERINEURAL AS NEEDED
Status: DISCONTINUED | OUTPATIENT
Start: 2022-11-23 | End: 2022-11-23 | Stop reason: HOSPADM

## 2022-11-23 RX ORDER — DIPHENHYDRAMINE HYDROCHLORIDE 50 MG/ML
12.5 INJECTION, SOLUTION INTRAMUSCULAR; INTRAVENOUS AS NEEDED
Status: DISCONTINUED | OUTPATIENT
Start: 2022-11-23 | End: 2022-11-23 | Stop reason: HOSPADM

## 2022-11-23 RX ORDER — ROCURONIUM BROMIDE 10 MG/ML
INJECTION, SOLUTION INTRAVENOUS AS NEEDED
Status: DISCONTINUED | OUTPATIENT
Start: 2022-11-23 | End: 2022-11-23 | Stop reason: HOSPADM

## 2022-11-23 RX ORDER — FENTANYL CITRATE 50 UG/ML
25 INJECTION, SOLUTION INTRAMUSCULAR; INTRAVENOUS
Status: DISCONTINUED | OUTPATIENT
Start: 2022-11-23 | End: 2022-11-23 | Stop reason: HOSPADM

## 2022-11-23 RX ORDER — LIDOCAINE HYDROCHLORIDE 10 MG/ML
0.1 INJECTION, SOLUTION EPIDURAL; INFILTRATION; INTRACAUDAL; PERINEURAL AS NEEDED
Status: DISCONTINUED | OUTPATIENT
Start: 2022-11-23 | End: 2022-11-23 | Stop reason: HOSPADM

## 2022-11-23 RX ORDER — HYDROMORPHONE HYDROCHLORIDE 1 MG/ML
.25-1 INJECTION, SOLUTION INTRAMUSCULAR; INTRAVENOUS; SUBCUTANEOUS
Status: DISCONTINUED | OUTPATIENT
Start: 2022-11-23 | End: 2022-11-23 | Stop reason: HOSPADM

## 2022-11-23 RX ORDER — HYDROMORPHONE HYDROCHLORIDE 2 MG/ML
INJECTION, SOLUTION INTRAMUSCULAR; INTRAVENOUS; SUBCUTANEOUS AS NEEDED
Status: DISCONTINUED | OUTPATIENT
Start: 2022-11-23 | End: 2022-11-23 | Stop reason: HOSPADM

## 2022-11-23 RX ORDER — SODIUM CHLORIDE, SODIUM LACTATE, POTASSIUM CHLORIDE, CALCIUM CHLORIDE 600; 310; 30; 20 MG/100ML; MG/100ML; MG/100ML; MG/100ML
125 INJECTION, SOLUTION INTRAVENOUS CONTINUOUS
Status: DISCONTINUED | OUTPATIENT
Start: 2022-11-23 | End: 2022-11-23 | Stop reason: HOSPADM

## 2022-11-23 RX ORDER — FENTANYL CITRATE 50 UG/ML
INJECTION, SOLUTION INTRAMUSCULAR; INTRAVENOUS AS NEEDED
Status: DISCONTINUED | OUTPATIENT
Start: 2022-11-23 | End: 2022-11-23 | Stop reason: HOSPADM

## 2022-11-23 RX ORDER — ONDANSETRON 2 MG/ML
4 INJECTION INTRAMUSCULAR; INTRAVENOUS AS NEEDED
Status: DISCONTINUED | OUTPATIENT
Start: 2022-11-23 | End: 2022-11-23 | Stop reason: HOSPADM

## 2022-11-23 RX ORDER — POVIDONE-IODINE 10 %
SOLUTION, NON-ORAL TOPICAL AS NEEDED
Status: DISCONTINUED | OUTPATIENT
Start: 2022-11-23 | End: 2022-11-23 | Stop reason: HOSPADM

## 2022-11-23 RX ORDER — SCOLOPAMINE TRANSDERMAL SYSTEM 1 MG/1
1 PATCH, EXTENDED RELEASE TRANSDERMAL
Status: DISCONTINUED | OUTPATIENT
Start: 2022-11-23 | End: 2022-11-23 | Stop reason: HOSPADM

## 2022-11-23 RX ORDER — GLYCOPYRROLATE 0.2 MG/ML
INJECTION INTRAMUSCULAR; INTRAVENOUS AS NEEDED
Status: DISCONTINUED | OUTPATIENT
Start: 2022-11-23 | End: 2022-11-23 | Stop reason: HOSPADM

## 2022-11-23 RX ORDER — MIDAZOLAM HYDROCHLORIDE 1 MG/ML
INJECTION, SOLUTION INTRAMUSCULAR; INTRAVENOUS AS NEEDED
Status: DISCONTINUED | OUTPATIENT
Start: 2022-11-23 | End: 2022-11-23 | Stop reason: HOSPADM

## 2022-11-23 RX ORDER — ONDANSETRON 2 MG/ML
INJECTION INTRAMUSCULAR; INTRAVENOUS AS NEEDED
Status: DISCONTINUED | OUTPATIENT
Start: 2022-11-23 | End: 2022-11-23 | Stop reason: HOSPADM

## 2022-11-23 RX ADMIN — HYDROMORPHONE HYDROCHLORIDE 1 MG: 2 INJECTION, SOLUTION INTRAMUSCULAR; INTRAVENOUS; SUBCUTANEOUS at 12:41

## 2022-11-23 RX ADMIN — ONDANSETRON HYDROCHLORIDE 4 MG: 2 SOLUTION INTRAMUSCULAR; INTRAVENOUS at 12:27

## 2022-11-23 RX ADMIN — PROPOFOL 140 MCG/KG/MIN: 10 INJECTION, EMULSION INTRAVENOUS at 10:17

## 2022-11-23 RX ADMIN — NEOSTIGMINE METHYLSULFATE 2 MG: 1 INJECTION, SOLUTION INTRAVENOUS at 12:30

## 2022-11-23 RX ADMIN — GLYCOPYRROLATE 0.1 MG: 0.2 INJECTION INTRAMUSCULAR; INTRAVENOUS at 12:30

## 2022-11-23 RX ADMIN — PROPOFOL 100 MG: 10 INJECTION, EMULSION INTRAVENOUS at 10:17

## 2022-11-23 RX ADMIN — MIDAZOLAM HYDROCHLORIDE 5 MG: 1 INJECTION, SOLUTION INTRAMUSCULAR; INTRAVENOUS at 10:06

## 2022-11-23 RX ADMIN — ROCURONIUM BROMIDE 10 MG: 10 INJECTION INTRAVENOUS at 10:16

## 2022-11-23 RX ADMIN — SODIUM CHLORIDE, POTASSIUM CHLORIDE, SODIUM LACTATE AND CALCIUM CHLORIDE: 600; 310; 30; 20 INJECTION, SOLUTION INTRAVENOUS at 11:11

## 2022-11-23 RX ADMIN — FENTANYL CITRATE 50 MCG: 50 INJECTION, SOLUTION INTRAMUSCULAR; INTRAVENOUS at 11:21

## 2022-11-23 RX ADMIN — FENTANYL CITRATE 50 MCG: 50 INJECTION, SOLUTION INTRAMUSCULAR; INTRAVENOUS at 11:28

## 2022-11-23 RX ADMIN — ROCURONIUM BROMIDE 40 MG: 10 INJECTION INTRAVENOUS at 10:17

## 2022-11-23 RX ADMIN — SODIUM CHLORIDE, POTASSIUM CHLORIDE, SODIUM LACTATE AND CALCIUM CHLORIDE 125 ML/HR: 600; 310; 30; 20 INJECTION, SOLUTION INTRAVENOUS at 09:00

## 2022-11-23 RX ADMIN — DEXAMETHASONE SODIUM PHOSPHATE 4 MG: 4 INJECTION, SOLUTION INTRAMUSCULAR; INTRAVENOUS at 10:38

## 2022-11-23 RX ADMIN — CEFAZOLIN SODIUM 2 G: 1 POWDER, FOR SOLUTION INTRAMUSCULAR; INTRAVENOUS at 10:26

## 2022-11-23 RX ADMIN — FENTANYL CITRATE 50 MCG: 50 INJECTION, SOLUTION INTRAMUSCULAR; INTRAVENOUS at 11:50

## 2022-11-23 RX ADMIN — FENTANYL CITRATE 100 MCG: 50 INJECTION, SOLUTION INTRAMUSCULAR; INTRAVENOUS at 10:12

## 2022-11-23 RX ADMIN — LIDOCAINE HYDROCHLORIDE 25 MG: 20 INJECTION, SOLUTION EPIDURAL; INFILTRATION; INTRACAUDAL; PERINEURAL at 10:16

## 2022-11-23 NOTE — ANESTHESIA POSTPROCEDURE EVALUATION
Procedure(s):  REVISION BREAST RECONSTRUCTION WITH BILATERAL BREAST IMPLANT EXCHANGE, TOTAL CAPSULECTOMY AND ACELLAR DERMAL MATRIX AND AUTOLOGUS TISSUE FAT TRANSFER TO BILATERAL BREASTS.    total IV anesthesia    Anesthesia Post Evaluation      Multimodal analgesia: multimodal analgesia used between 6 hours prior to anesthesia start to PACU discharge  Patient location during evaluation: bedside  Patient participation: complete - patient participated  Level of consciousness: awake  Pain management: adequate  Airway patency: patent  Anesthetic complications: no  Cardiovascular status: acceptable  Respiratory status: acceptable  Hydration status: acceptable        INITIAL Post-op Vital signs:   Vitals Value Taken Time   /59 11/23/22 1300   Temp 36.7 °C (98 °F) 11/23/22 1300   Pulse 60 11/23/22 1311   Resp 12 11/23/22 1311   SpO2 100 % 11/23/22 1311   Vitals shown include unvalidated device data.

## 2022-11-23 NOTE — BRIEF OP NOTE
Brief Postoperative Note    Patient: rFancis Gamez  YOB: 1990  MRN: [id number]    Date of Procedure: 11/23/2022     Pre-Op Diagnosis: BREAST CANCER    Post-Op Diagnosis: Same as preoperative diagnosis. Procedure(s):  REVISION BREAST RECONSTRUCTION WITH BILATERAL BREAST IMPLANT EXCHANGE, TOTAL CAPSULECTOMY AND ACELLAR DERMAL MATRIX AND AUTOLOGUS TISSUE FAT TRANSFER TO BILATERAL BREASTS    Surgeon(s):  Rosey Riddle MD    Surgical Assistant: Nurse Practitioner: Juanis Horowitz NP    Anesthesia: General     Estimated Blood Loss (mL): less than 326     Complications: None    Specimens:   ID Type Source Tests Collected by Time Destination   1 : RIGHT breast capsule Preservative Breast  Rosey Riddle MD 11/23/2022 1057 Pathology        Implants:   Implant Name Type Inv.  Item Serial No.  Lot No. LRB No. Used Action   IMPLANT BRST GEL 5.1 CM PROJCT 13.9  CC [employer] HSC+ - [id number]  IMPLANT BRST GEL 5.1 CM PROJCT 13.9  CC [employer] HSC+ [id number] [employer]WD NA Right 1 Explanted   IMPLANT BRST GEL 5.2 CM PROJCT 14.1  CC [employer]+ - [id number]  IMPLANT BRST GEL 5.2 CM PROJCT 14.1  CC [employer]OTH HSC+ [id number] [employer]WD NA Left 1 Explanted   sientra 540 hsc Breast  [id number] [employer] N/A Right 1 Implanted   sientra 625 hsc   [id number] [employer] N/A Left 1 Implanted   GRAFT HUM TISS L [id number] THK0.7-1.4MM THN ACELLULAR - [id number]  GRAFT HUM TISS L [id number] THK0.7-1.4MM THN ACELLULAR [id number] [employer] N/A Right 1 Implanted       Drains: * No LDAs found *    Findings: none    Electronically Signed by Marya Payne MD on 11/23/2022 at 12:27 PM

## 2022-11-23 NOTE — ANESTHESIA PREPROCEDURE EVALUATION
Relevant Problems   No relevant active problems       Anesthetic History     PONV          Review of Systems / Medical History  Patient summary reviewed and nursing notes reviewed    Pulmonary  Within defined limits            Pertinent negatives: No recent URI     Neuro/Psych   Within defined limits           Cardiovascular  Within defined limits                Exercise tolerance: >4 METS     GI/Hepatic/Renal  Within defined limits           Pertinent negatives: No GERD   Endo/Other  Within defined limits           Other Findings              Physical Exam    Airway  Mallampati: II  TM Distance: 4 - 6 cm  Neck ROM: normal range of motion   Mouth opening: Normal     Cardiovascular    Rhythm: regular  Rate: normal         Dental  No notable dental hx       Pulmonary  Breath sounds clear to auscultation               Abdominal         Other Findings            Anesthetic Plan    ASA: 1  Anesthesia type: total IV anesthesia          Induction: Intravenous  Anesthetic plan and risks discussed with: Patient

## 2022-11-23 NOTE — H&P
Plastic Surgery PRE-OP Admission History and Physical    Patient: Sun Galeano MRN: [id number]  SSN: [id number]    YOB: 1990  Age: 29 y.o. Sex: female            Subjective:   Patient is a 29 y.o.  female who presents for bilateral breast reconstruction with capsulectomy and implant exchange. The patient was evaluated and determined the most appropriate plan of care is to proceed with surgical intervention. Patient denies chest pain, tightness, pain radiating down left arm, palpitations, dizziness, lightheadedness, fevers, chills. Patient denies shortness of breath, wheezing, diarrhea, constipation, abdominal pain. Patient denies urinary problems, dysuria, hesitancy, urgency. Denies skin breakdown, rashes, insect bites or open area.          Patient Active Problem List    Diagnosis Date Noted    Family hx-breast malignancy 02/24/2022    Pregnancy 04/26/2016    Pregnant 10/21/2014     Past Medical History:   Diagnosis Date    PONV (postoperative nausea and vomiting)     PONV X2 surgeries    Unspecified adverse effect of anesthesia     \"takes more than usual to get me to sleep\", w/every surgery, not wisdom teeth sedation      Past Surgical History:   Procedure Laterality Date    HX BREAST RECONSTRUCTION Bilateral 04/04/2022    BILATERAL BREAST RECONSTRUCTION, DIRECT TO IMPLANT RECONSTRUCTION AND FLEX HD performed by Boo Cavazos MD at [address] [place] [address]    HX BREAST RECONSTRUCTION Bilateral 06/27/2022    REVISION BILATERAL BREAST RECONSTRUCTION WITH POCKET REVISION AND FAT GRAFTING TO BILATERAL BREASTS performed by Boo Cavazos MD at [address] [place] [address]    HX ENDOSCOPY      HX MASTECTOMY Bilateral 04/04/2022    BILATERAL BREAST PROPHYLACTIC MASTECTOMIES performed by Galindo Butler MD at [place] [address] Right 05/2006    ORIF, ankle with 2 screws place    HX ORTHOPAEDIC Right 02/2007    screws removed from ankle    HX ORTHOPAEDIC Left 06/2013    ORIF, ankle w/2 wires, 8 screws and 2 plates    HX WISDOM TEETH EXTRACTION  2010    [facility] LEG/ANKLE SURGERY PROC UNLISTED Left 2015    K wires removed and debrided ankle      Prior to Admission medications    Medication Sig Start Date End Date Taking? Authorizing Provider   amoxicillin-clavulanate (AUGMENTIN) 875-125 mg per tablet Take 1 Tablet by mouth two (2) times a day. Start 11/22/22   Yes Provider, Historical   norgestimate-ethinyl estradioL (ORTHO TRI-CYCLEN, TRI-SPRINTEC) 0.18/0.215/0.25 mg-35 mcg (28) tab Take 1 Tablet by mouth nightly. Yes Provider, Historical   acetaminophen (TYLENOL) 500 mg tablet Take 1,000 mg by mouth every six (6) hours as needed for Pain. Yes Provider, Historical     Current Facility-Administered Medications   Medication Dose Route Frequency    lidocaine (PF) (XYLOCAINE) 10 mg/mL (1 %) injection 0.1 mL  0.1 mL SubCUTAneous PRN    lactated Ringers infusion  125 mL/hr IntraVENous CONTINUOUS    sodium chloride (NS) flush 5-40 mL  5-40 mL IntraVENous Q8H    sodium chloride (NS) flush 5-40 mL  5-40 mL IntraVENous PRN    naloxone (NARCAN) injection 0.04 mg  0.04 mg IntraVENous Multiple    flumazeniL (ROMAZICON) 0.1 mg/mL injection 0.2 mg  0.2 mg IntraVENous Multiple    ceFAZolin (ANCEF) 2 g in sterile water (preservative free) 20 mL IV syringe  2 g IntraVENous ONCE    scopolamine (TRANSDERM-SCOP) 1 mg over 3 days 1 Patch  1 Patch TransDERmal Q72H      No Known Allergies   Social History     Tobacco Use    Smoking status: Never     Passive exposure: Past    Smokeless tobacco: Never   Substance Use Topics    Alcohol use:  Yes     Alcohol/week: 0.0 - 3.0 standard drinks      Family History   Problem Relation Age of Onset    Breast Cancer Mother     No Known Problems Father     No Known Problems Brother     No Known Problems Son     No Known Problems Daughter     Breast Cancer Maternal Grandmother     Breast Cancer Maternal Aunt         Review of Systems  A comprehensive review of systems was negative except for that written in the HPI. Objective:   Patient Vitals for the past 8 hrs:   BP Temp Pulse Resp SpO2 Height Weight   22 0838 117/75 98.1 °F (36.7 °C) 95 18 100 % 5' 4\" (1.626 m) 54.4 kg (120 lb)     Temp (24hrs), Av.1 °F (36.7 °C), Min:98.1 °F (36.7 °C), Max:98.1 °F (36.7 °C)      Gen: Well-developed,  in no acute distress   HEENT: Pink conjunctivae, PERRL, hearing intact to voice, moist mucous membranes   Neck: Supple, without masses, thyroid non-tender   Resp: No accessory muscle use,  breathing even/ nonlabored. Card: Regular rate and rhythm. Abd: Soft, non-tender, non-distended,   Lymph: No cervical or inguinal adenopathy   Musc: WNL  Skin: No skin breakdown noted. Skin warm, pink, dry. No rashes. Neuro: Cranial nerves are grossly intact, no focal motor weakness, follows commands appropriately   Psych: Good insight, oriented to person, place and time, alert      Labs:   Recent Results (from the past 24 hour(s))   HCG URINE, QL. - POC    Collection Time: 22  8:42 AM   Result Value Ref Range    Pregnancy test,urine (POC) Negative NEG         Assessment:     Patient Active Problem List    Diagnosis Date Noted    Family hx-breast malignancy 2022    Pregnancy 2016    Pregnant 10/21/2014         Plan:   Proceed with surgical intervention without contraindications. I met with pt. this morning and discussed increased ambulation to improve pulmonary status. We also discussed preop and postop expectations to include pain management. All questions were answered.         Renee Burch NP

## 2022-11-23 NOTE — DISCHARGE INSTRUCTIONS
Breast Reconstruction with liposuction and fat grafting patient Instructions  Dr. Bert Andersen, NP      Activities  Immediatly after  surgery you will rest quietly for the first 48 hours. You will be able to walk around the house and perform light daily activities; however, during this time, it is not at all unusual for you to feel some pain, soreness and pressure in the chest area. This will gradually subside and Dr. Malik Parents will give you pain medication to relieve it. Be sure to lie on your back whenever you rest or sleep. Keep your head elevated for 72 hours after surgery as this will help with swelling. No heavy exercise or lifting for three weeks following surgery. This will allow the implants to remain in the proper position without movement. For the first three days following surgery you should try to restrict your arm movements. Move your arms slowly and avoid sudden jerky movements of the chest and breast area. Keep your arms as close to your body as possible. This allows the implants to remain in place. Dr. Malik Parents encourages walking immediately after surgery. This activity will greatly minimize the risk of blood clots in your leg veins. Bathing: You will then be allowed to shower two days after surgery. Wash yourself everywhere, including the incisions gently. You will have steristrips on all of your incisions. Do you not remove these. You can let water run over your steristrips. Do not submerge yourself in a bath, swimming pool, or whirlpool for two weeks. Under garments: The surgery bra that you have been put in should be worn constantly during the day and at night. You will be changed out of that surgery bra to a more comfortable bra in the office at your first post operative appointment. You will wear the sports bra for a total of two to three weeks after surgery. You may also wear a soft sports bra with light support instead of the surgery bra if preferred. Abdominal binder:  Wear this until your first post operative visit. After this, you can switch to compression undergarments (such as spanx)      The maximum swelling occurs at about three days and then begins to dramatically improve. Mild bruising typically resolves within 14 days. PLEASE DO NOT PUT ICE ON YOUR BREASTS    Medications:      Dilaudid: or Percocet  this is a your pain medication. Take one to two tablets as needed every 3-4 hours. No NSAIDS (advil, ibuprofen 5 days after surgery. This will increase your bleeding risk. Tylenol: (over the counter) 650 mg every 4 hours as needed for mild pain. Be careful because percocet has tylenol in it. You can not exceed 4000 mg a day of tylenol. Zofran: this is a medication for nausea. Take this as needed for nausea every 6-8 hours. Make sure you drink plenty of fluids. Nausea usually resolves after the first 24 hours. Augmentin or Levaquin: this is your antibiotic. Take this medication completley until empty. Valium: this is for muscle relaxation. Your implant was placed beneath the pectoral muscle. This muscle can sometimes feel tight. Valium can help relax this muscle. Stool softeners: while taking narcotics, take a stool softener (over the counter) twice daily. Incease fluids, fiber. It is very important to keep your bowels regular while taking narcotics. Work: You should plan to be off work for 5-6 days, although this can vary from person to person. Do not expose your breasts to the sun for eight weeks after surgery. Follow up: Please present to Dr. Twila Molina office at your scheduled appointment made prior to surgery. If you do not have an appt, please call the office ASAP to make your first post op visit. We like to see you within one to two days after surgery. Please notify Dr. Rupa Howe if:   If your one breast becomes significantly larger than the other  If you develop significant bruising across the chest   If you experience a significant increase in pain in the breast after the pain has improved  If you develop a temperature above 101.5° F  If you develop redness (like a sunburn) around your incisions  If you need help or have any questions feel free to call Dr Gwyn Pelaez with your concerns. Dr. Gwyn Pelaez is on call 24 hours per day, seven days per week and has an answering service to forward calls. Breast Massage Following Breast Augmentation   Breast massage will be taught to you TWO weeks  from surgery. No massage is recommended before 2 weeks. The purpose of these exercises is to keep the scar tissue that forms around implants as soft as possible. By performing these exercises as described, you can help soften the internal scar, minimize the risk of significant capsular contracture and maximize the likelihood of a soft, natural feel and appearance to your breast.    Begin doing the exercises two weeks after surgery. Dr. Gwyn Pelaez will show you the technique during your second post-operative visit. It is important to remember that slow steady massage is more effective than quick jerky movements. Don't worry about injuring the implant; you cannot cause a rupture with these exercises. Press the breasts slowly and maximally inwards (towards your breast bone) and hold for 10 seconds, then release. Repeat four times. Press the breasts apart slowly and maximally outwards and hold for 10 seconds, then release. Repeat four times. Repeat for downward movement. Repeat for upward movement. Perform these exercises four times per day for the first three months. The quality of your cosmetic enhancement may be compromised if you fail to return for any scheduled post-op visits, or follow the pre- and post-operative instructions. Please dont hesitate to report any unusual or concerning changes. Our number is [phone].      DISCHARGE SUMMARY from your Nurse      PATIENT INSTRUCTIONS    After general anesthesia or intravenous sedation, for 24 hours or while taking prescription Narcotics:  Limit your activities  Do not drive and operate hazardous machinery  Do not make important personal or business decisions  Do  not drink alcoholic beverages  If you have not urinated within 8 hours after discharge, please contact your surgeon on call. Report the following to your surgeon:  Excessive pain, swelling, redness or odor of or around the surgical area  Temperature over 100.5  Nausea and vomiting lasting longer than 4 hours or if unable to take medications  Any signs of decreased circulation or nerve impairment to extremity: change in color, persistent  numbness, tingling, coldness or increase pain  Any questions      GOOD HELP TO FIGHT AN INFECTION  Here are a few tip to help reduce the chance of getting an infection after surgery:  Wash Your Hands  Good handwashing is the most important thing you and your caregiver can do. Wash before and after caring for any wounds. Dry your hand with a clean towel. Wash with soap and water for at least 20 seconds. A TIP: sing the \"Happy Birthday\" song through one time while washing to help with the timing. Use a hand  in between washings. Shower  When your surgeon says it is OK to take a shower, use a new bar of antibacterial soap (if that is what you use, and keep that bar of soap ONLY for your use), or antibacterial body wash. Use a clean wash cloth or sponge when you bathe. Dry off with a clean towel  after every bath - be careful around any wounds, skin staples, sutures or surgical glue over/on wounds. Do not enter swimming pools, hot tubs, lakes, rivers and/or ocean until wounds are healed and your doctor/surgeon says it is OK. Use Clean Sheets  Sleep on freshly laundered sheets after your surgery. Keep the surgery site covered with a clean, dry bandage (if instructed to do so). If the bandage becomes soiled, reapply a new, dry, clean bandage.    Do not allow pets to sleep with you while your wound is healing. Lifestyle Modification and Controlling Your Blood Sugar  Smoking slows wound healing. Stop smoking and limit exposure to second-hand smoke. High blood sugar slows wound healing. Eat a well-balanced diet to provide proper nutrition while healing  Monitor your blood sugar (if you are a diabetic) and take your medications as you are suppose to so you can control you blood sugar after surgery. COUGH AND DEEP BREATHE    Breathing deeply and coughing are very important exercises to do after surgery. Deep breathing and coughing open the little air tubes and air sacks in your lungs. You take deep breaths every day. You may not even notice - it is just something you do when you sigh or yawn. It is a natural exercise you do to keep these air passages open. After surgery, take deep breaths and cough, on purpose. DIRECTIONS:  Take 10 to 15 slow deep breaths every hour while awake. Breathe in deeply, and hold it for 2 seconds. Exhale slowly through puckered lips, like blowing up a balloon. After every 4th or 5th deep breath, hug your pillow to your chest or belly and give a hard, deep cough. Yes, it will probably hurt. But doing this exercise is a very important part of healing after surgery. Take your pain medicine to help you do this exercise without too much pain. Coughing and deep breathing help prevent bronchitis and pneumonia after surgery. If you had chest or belly surgery, use a pillow as a \"hug temitope\" and hold it tightly to your chest or belly when you cough. ANKLE PUMPS    Ankle pumps increase the circulation of oxygenated blood to your lower extremities and decrease your risk for circulation problems such as blood clots. They also stretch the muscles, tendons and ligaments in your foot and ankle, and prevent joint contracture in the ankle and foot, especially after surgeries on the legs.     It is important to do ankle pump exercises regularly after surgery because immobility increases your risk for developing a blood clot. Your doctor may also have you take an Aspirin for the next few days as well. If your doctor did not ask you to take an Aspirin, consult with him before starting Aspirin therapy on your own. The exercise is quite simple. Slowly point your foot forward, feeling the muscles on the top of your lower leg stretch, and hold this position for 5 seconds. Next, pull your foot back toward you as far as possible, stretching the calf muscles, and hold that position for 5 seconds. Repeat with the other foot. Perform 10 repetitions every hour while awake for both ankles if possible (down and then up with the foot once is one repetition). You should feel gentle stretching of the muscles in your lower leg when doing this exercise. If you feel pain, or your range of motion is limited, don't push too hard. Only go the limit your joint and muscles will let you go. If you have increasing pain, progressively worsening leg warmth or swelling, STOP the exercise and call your doctor. MEDICATION AND   SIDE EFFECT GUIDE    The [address] [place] [address] MEDICATION AND SIDE EFFECT GUIDE was provided to the PATIENT AND CARE PROVIDER. Information provided includes instruction about drug purpose and common side effects for the following medications:   None        These are general instructions for a healthy lifestyle:    *   Please give a list of your current medications to your Primary Care Provider. *   Please update this list whenever your medications are discontinued, doses are changed, or new medications (including over-the-counter products) are added. *   Please carry medication information at all times in case of emergency situations.       About Smoking  No smoking / No tobacco products  Avoid exposure to second hand smoke     Surgeon General's Warning:  Quitting smoking now greatly reduces serious risk to your health. Obesity, smoking, and sedentary lifestyle greatly increases your risk for illness and disease. A healthy diet, regular physical exercise & weight monitoring are important for maintaining a healthy lifestyle. Congestive Heart Failure  You may be retaining fluid if you have a history of heart failure or if you experience any of the following symptoms:  Weight gain of 3 pounds or more overnight or 5 pounds in a week, increased swelling in your hands or feet or shortness of breath while lying flat in bed. Please call your doctor as soon as you notice any of these symptoms; do not wait until your next office visit. Recognize signs and symptoms of STROKE:  F -  Face looks uneven  A -  Arms unable to move or move evenly  S -  Speech slurred or non-existent  T -  Time-call 911 as soon as signs and symptoms begin-DO NOT go          back to bed or wait to see if you get better-TIME IS BRAIN. Warning Signs of HEART ATTACK   Call 911 if you have these symptoms:    Chest discomfort. Most heart attacks involve discomfort in the center of the chest that lasts more than a few minutes, or that goes away and comes back. It can feel like uncomfortable pressure, squeezing, fullness, or pain. Discomfort in other areas of the upper body. Symptoms can include pain or discomfort in one or both arms, the back, neck, jaw, or stomach. Shortness of breath with or without chest discomfort. Other signs may include breaking out in a cold sweat, nausea, or lightheadedness. Don't wait more than five minutes to call 911 - MINUTES MATTER! Fast action can save your life. Calling 911 is almost always the fastest way to get lifesaving treatment. Emergency Medical Services staff can begin treatment when they arrive -- up to an hour sooner than if someone gets to the hospital by car.        Learning About Coronavirus (COVID-19)  Coronavirus [phone]): Overview  What is coronavirus (COVID-19)? The coronavirus disease (COVID-19) is caused by a virus. It is an illness that was first found in [place], [place], in December 2019. It has since spread worldwide. The virus can cause fever, cough, and trouble breathing. In severe cases, it can cause pneumonia and make it hard to breathe without help. It can cause death. Coronaviruses are a large group of viruses. They cause the common cold. They also cause more serious illnesses like Middle East respiratory syndrome (MERS) and severe acute respiratory syndrome (SARS). COVID-19 is caused by a novel coronavirus. That means it's a new type that has not been seen in people before. This virus spreads person-to-person through droplets from coughing and sneezing. It can also spread when you are close to someone who is infected. And it can spread when you touch something that has the virus on it, such as a doorknob or a tabletop. What can you do to protect yourself from coronavirus (COVID-19)? The best way to protect yourself from getting sick is to: Avoid areas where there is an outbreak. Avoid contact with people who may be infected. Wash your hands often with soap or alcohol-based hand sanitizers. Avoid crowds and try to stay at least 6 feet away from other people. Wash your hands often, especially after you cough or sneeze. Use soap and water, and scrub for at least 20 seconds. If soap and water aren't available, use an alcohol-based hand . Avoid touching your mouth, nose, and eyes. What can you do to avoid spreading the virus to others? To help avoid spreading the virus to others:  Cover your mouth with a tissue when you cough or sneeze. Then throw the tissue in the trash. Use a disinfectant to clean things that you touch often. Stay home if you are sick or have been exposed to the virus. Don't go to school, work, or public areas. And don't use public transportation.   If you are sick:  Leave your home only if you need to get medical care. But call the doctor's office first so they know you're coming. And wear a face mask, if you have one. If you have a face mask, wear it whenever you're around other people. It can help stop the spread of the virus when you cough or sneeze. Clean and disinfect your home every day. Use household  and disinfectant wipes or sprays. Take special care to clean things that you grab with your hands. These include doorknobs, remote controls, phones, and handles on your refrigerator and microwave. And don't forget countertops, tabletops, bathrooms, and computer keyboards. When to call for help  Call 911 anytime you think you may need emergency care. For example, call if:  You have severe trouble breathing. (You can't talk at all.)  You have constant chest pain or pressure. You are severely dizzy or lightheaded. You are confused or can't think clearly. Your face and lips have a blue color. You pass out (lose consciousness) or are very hard to wake up. Call your doctor now if you develop symptoms such as:  Shortness of breath. Fever. Cough. If you need to get care, call ahead to the doctor's office for instructions before you go. Make sure you wear a face mask, if you have one, to prevent exposing other people to the virus. Where can you get the latest information? The following health organizations are tracking and studying this virus. Their websites contain the most up-to-date information. Apolinar Manjit also learn what to do if you think you may have been exposed to the virus. U.S. Centers for Disease Control and Prevention (CDC): The CDC provides updated news about the disease and travel advice. The website also tells you how to prevent the spread of infection. www.cdc.gov  World Health Organization [facility]): WHO offers information about the virus outbreaks. WHO also has travel advice. www.who.int  Current as of: April 1, 2020               Content Version: 12.4  © 6254-3246 [employer].    Care instructions adapted under license by your healthcare professional. If you have questions about a medical condition or this instruction, always ask your healthcare professional. [place] [address] any warranty or liability for your use of this information. The discharge information has been reviewed with the Boyfriend. Any questions and concerns from the Boyfriend have been addressed. The Boyfriend verbalized understanding. CONTENTS FOUND IN YOUR DISCHARGE ENVELOPE:  [x]     Surgeon and Hospital Discharge Instructions  [x]     [facility] Care Provider Card  []     Medication & Side Effect Guide            (your newly prescribed medications have been marked/highlighted showing the most common side effects from   the classes of drugs on your prescriptions)  [x]     Medication Prescription(s) x 0 ( [] These have been sent electronically to your pharmacy by your surgeon,   - OR -       your surgeon has already provided these to you during a previous/pre-op office visit)  []     [address]  []     Physical Therapy Prescription  []     Follow-up Appointment Cards  []     Surgery-related Pictures/Media  []     Pain block and/or block with On-Q Catheter from Anesthesia Service (information included in your instructions above)  []     Medical device information sheets/pamphlets from their    []     School/work excuse note. []     /parent work excuse note. The following personal items collected during your admission are returned to you:   Dental Appliance: Dental Appliances: None  Vision: Visual Aid: None  Hearing Aid:    Jewelry: Jewelry: None  Clothing: Clothing: Footwear, Pants, Shirt, Undergarments, Socks  Other Valuables:  Other Valuables: Cell Phone  Valuables sent to safe:

## 2022-11-30 NOTE — OP NOTES
Tomasz Benites [facility]  OPERATIVE REPORT    Name:  Errol Cadet  MR#:  [id number]  :  1990  ACCOUNT #:  [de-identified]  DATE OF SERVICE:  2022    PREOPERATIVE DIAGNOSES:  1. Genetic predisposition of the breast cancer. 2.  Surgical absence, bilateral breasts. 3.  Breast asymmetry. 4.  Capsule contracture, right breast.    POSTOPERATIVE DIAGNOSES:  1. Genetic predisposition of the breast cancer. 2.  Surgical absence, bilateral breasts. 3.  Breast asymmetry. 4.  Capsule contracture, right breast.    PROCEDURES PERFORMED:  1. Reconstruction of right and left breasts with removal of silicone breast prosthesis. 2.  Reconstruction of right and left breasts with extensive open capsulotomy and capsulectomy. 3.  Reconstruction of right and left breasts with insertion of silicone breast prosthesis. 4.  Reconstruction of right and left breasts with autologous tissue fat transfer totaling 345 mL. 5.  Reconstruction of right breast with acellular dermal matrix 13 cm x 22 cm. 6.  Reconstruction of left breast with lateral inferior capsulorrhaphy. SURGEON:  Mike Downey MD    ASSISTANT:  Jamir Aguirre NP. Due to complexity of this procedure, surgical assistance was required. HORTENSIA Coates assisted with the entire procedure including implant placement, capsulotomy, capsulectomy, breast implant placement, harvesting and placement of fat grafts, acellular dermal matrix placement and capsulorrhaphy, complex closure of the wounds and drain placement and dressing placement. ANESTHESIA:  General endotracheal.    COMPLICATIONS:  None. SPECIMENS REMOVED:  1. Excised capsule, right breast.  2.  Excised capsule, left breast.  3.  Total aspirate liposuction 500 mL. IMPLANTS:  See note. ESTIMATED BLOOD LOSS:  100 mL. DRAINS:  19-Belarusian Navin x1. COUNTS:  Correct x2.     DISPOSITION:  Stable to PACU.    BRIEF HISTORY:  The patient is a 61-year-old female who is well known to our clinic. She underwent previous bilateral total skin and nipple-areolar sparing mastectomies via an extended inframammary approach with immediate direct implant reconstruction. She now has developed grade 4 capsule contracture of the right breast as well as lateral inferior subluxation of the left implant. She now presents for revision. The overall procedure, incisional approach, expected outcomes and recovery were discussed. The risks, benefits and alternatives to the procedure including but not limited to bleeding, infection, damage to surrounding tissue structures, the need for revisional surgery, seroma, hematoma, capsule contracture, implant rupture, implant deflation, the need for future implant maintenance and surveillance MRIs, recurrent capsular contracture, PE, DVT, fat embolism, atrophy of fat grafts, oral cyst, calcium deposits were discussed. She understands the possibility of non incorporation of acellular dermal matrix. She also understands that this is a complex revision and results cannot be guaranteed or warrantied. She agreed to proceed. PROCEDURE NOTE:  The preoperative markings were made with the patient in the standing position. Informed consent was obtained. The patient was taken to the operating room, placed supine on the operating table. Sequential compression boots were placed. General endotracheal anesthesia was obtained. A lower body Juan J Hugger was placed. The chest and abdominal wall were prepped and draped in the usual sterile fashion. Two lower abdominal wall liposuction port sites were made sharply. The anterior abdominal wall and anterior flanks were then infused with 1000 mL of standard tumescent solution consisting of 1 liter of warm lactated Ringer's mixed with 1 ampule of epinephrine and 10 mL of 2% lidocaine plain using a Lamis infiltrating cannula.   Tumescent liposuction was then performed with a 4.0-mm blunt Mercedes tip flared cannula in the deep and intermediate planes. Cross tunneling was performed at all times and the adequacy of the resection was confirmed with a deep palpation and pinch test.  A total of 500 mL of lipoaspirate was removed. The aspirate was captured directly into a sterile trap on the operating field. The supernatant was decanted and the grafts were loaded into syringes for later grafting. The port sites were closed with 5-0 plain gut suture. Attention was then turned to the right breast.  The extended inframammary incision was then made sharply. Dissection carried down through the subcutaneous tissue to level of the anterior capsule. The anterior capsule was opened. The previous breast implant was removed. The pocket was then explored with a lighted retractor, the capsule was severely thickened and tightened and an extensive open capsulotomy and capsulectomy was performed. Circumferential capsulotomies were performed along the base and extensive strip anterior capsulectomy was performed in order to reshape the pocket. The pectoralis major muscle was then identified and the muscle was then raised from the overlying skin flap. The entire pocket was then irrigated with 500 mL of half-strength Betadine solution, 2 liters of triple antibiotic solution. Gloves were changed. Acellular dermal matrix 22 cm x 13 cm was then presoaked in triple antibiotic solution. Graft, serial number [id number], was then placed. The upper border of the graft was sutured to the lower border of the pectoralis major muscle with a running 2-0 PDS suture. Multiple silicone sizers were placed and a Sientra smooth round moderate plus profile 540 mL implant was chosen, serial number [id number] was used. The implant was presoaked in triple antibiotic solution. Gloves were changed. The implant was then placed in the subpectoral pocket via Warner funnel using the no-touch technique.   The inferior border of the graft was then sutured to the interior of the inframammary fold with interrupted 2-0 Monocryl sutures. A #19-Egyptian Vera Poke drain was brought through a lateral stab incision and secured into skin with 3-0 nylon and placed within the wound bed. Exparel 50 mL was expanded to 100 mL. Half of this mixture was injected into the pectoralis major muscle along the incision lines to provide long-lasting local anesthesia. The superior fasciocutaneous breast flap was transposed inferiorly to its anatomic position. The wound was then closed with running 2-0 PDS suture on the anterior capsule and deep subcutaneous tissue followed by running deep dermal and subcuticular 3-0 Monocryl. Attention was then turned to the left breast.  The left breast was then reopened in a similar fashion. The breast implant was removed. The capsule on this side was thin and healthy. However, severe inferior lateral subluxation of the implant and pocket was seen. The pocket was then revised with superior medial capsulotomy and extensive strip anterior capsulectomy to provide improved projection. Lateral inferior capsulorrhaphy was then performed with a double layered running imbricating 2-0 PDS suture. Multiple silicone sizers were placed and a smooth round moderate plus profile Sientra 625 mL implant, serial number [id number], was chosen. The pocket was then irrigated with 500 mL of half-strength Betadine solution, 2 liters of triple antibiotic solution. Exparel was placed in a similar fashion. The implant was then placed via a Warner funnel having changed gloves using the no-touch technique. Orientation of the implant was confirmed. The flap was transposed inferiorly to its new anatomic position and closed in a similar fashion. Autologous fat was then transferred to each breast.  Multiple percutaneous port sites were created with an 11-blade. Fat was then transferred using a 2-mm type 2 Olson cannula in the deep dermal and superficial subcutaneous planes.   A total of 345 mL of fat was then transferred to the upper pole, upper medial pole of each breast and anterior surface of each breast.  The contour improvement appeared satisfactory. The port sites were closed with 6-0 Prolene sutures. The wounds were then dressed with Steri-Strips, 4x4s, Medipore tape. A compression garment and a surgical bra was placed. Anesthesia was then discontinued. The patient extubated in the operating room having tolerated the procedure well. The needle, instrument and laparotomy pad counts at the end of the case were correct.       Vazquez Tomas MD      [id number]  D:  11/29/2022 17:14  T:  11/30/2022 4:22  JOB #:  [id number]

## 2024-03-12 ENCOUNTER — HOSPITAL ENCOUNTER (OUTPATIENT)
Facility: HOSPITAL | Age: 34
Discharge: HOME | End: 2024-03-15

## 2024-03-12 VITALS
RESPIRATION RATE: 18 BRPM | TEMPERATURE: 97.7 F | OXYGEN SATURATION: 100 % | HEART RATE: 84 BPM | SYSTOLIC BLOOD PRESSURE: 128 MMHG | DIASTOLIC BLOOD PRESSURE: 85 MMHG | WEIGHT: 143 LBS | BODY MASS INDEX: 24.41 KG/M2 | HEIGHT: 64 IN

## 2024-03-12 NOTE — PERIOP NOTE
[facility]                   [address] [place] [address]   MAIN OR                                  [phone]   MAIN PRE OP                          [phone]                                                                                AMBULATORY PRE OP          [phone]  PRE-ADMISSION TESTING    [phone]     Surgery Date:  z       Is surgery arrival time given by surgeon?  NO  If “NO”, [facility] staff will call you between 3 and 7pm the day before your surgery with your arrival time. (If your surgery is on a Monday, we will call you the Friday before.)    Call [phone] after 7pm Monday-Friday if you did not receive this call.    INSTRUCTIONS BEFORE YOUR SURGERY   When You  Arrive Arrive at the 2nd Floor Admitting Desk on the day of your surgery  Have your insurance card, photo ID, and any copayment (if needed)   Food   and   Drink NO food or drink after midnight the night before surgery    This means NO water, gum, mints, coffee, juice, etc.  No alcohol (beer, wine, liquor) 24 hours before and after surgery   Medications to   TAKE   Morning of Surgery MEDICATIONS TO TAKE THE MORNING OF SURGERY WITH A SIP OF WATER:   none   Medications  To  STOP      7 days before surgery Non-Steroidal anti-inflammatory Drugs (NSAID's): for example, Ibuprofen (Advil, Motrin), Naproxen (Aleve)  Aspirin, if taking for pain   Herbal supplements, vitamins, and fish oil  Other:  (Pain medications not listed above, including Tylenol may be taken)   Blood  Thinners If you take  Aspirin, Plavix, Coumadin, or any blood-thinning or anti-blood clot medicine, talk to the doctor who prescribed the medications for pre-operative instructions.   Bathing Clothing  Jewelry  Valuables     If you shower the morning of surgery, please do not apply anything to your skin (lotions, powders, deodorant, or makeup, especially mascara)  Follow Chlorhexidine Care Fusion body wash instructions

## 2024-03-20 ENCOUNTER — HOSPITAL ENCOUNTER (OUTPATIENT)
Facility: HOSPITAL | Age: 34
Setting detail: OUTPATIENT SURGERY
Discharge: HOME | End: 2024-03-20
Attending: SURGERY | Admitting: SURGERY
Payer: COMMERCIAL

## 2024-03-20 ENCOUNTER — ANESTHESIA (OUTPATIENT)
Facility: HOSPITAL | Age: 34
End: 2024-03-20
Payer: COMMERCIAL

## 2024-03-20 ENCOUNTER — ANESTHESIA EVENT (OUTPATIENT)
Facility: HOSPITAL | Age: 34
End: 2024-03-20
Payer: COMMERCIAL

## 2024-03-20 VITALS
BODY MASS INDEX: 24.72 KG/M2 | DIASTOLIC BLOOD PRESSURE: 61 MMHG | TEMPERATURE: 97.5 F | OXYGEN SATURATION: 100 % | SYSTOLIC BLOOD PRESSURE: 97 MMHG | HEART RATE: 94 BPM | WEIGHT: 141.76 LBS | RESPIRATION RATE: 15 BRPM

## 2024-03-20 LAB — HCG UR QL: NEGATIVE

## 2024-03-20 PROCEDURE — 88305 TISSUE EXAM BY PATHOLOGIST: CPT

## 2024-03-20 PROCEDURE — 2580000003 HC RX 258: Performed by: SURGERY

## 2024-03-20 PROCEDURE — 7100000011 HC PHASE II RECOVERY - ADDTL 15 MIN: Performed by: SURGERY

## 2024-03-20 PROCEDURE — 6370000000 HC RX 637 (ALT 250 FOR IP): Performed by: SURGERY

## 2024-03-20 PROCEDURE — 2580000003 HC RX 258: Performed by: NURSE ANESTHETIST, CERTIFIED REGISTERED

## 2024-03-20 PROCEDURE — 7100000010 HC PHASE II RECOVERY - FIRST 15 MIN: Performed by: SURGERY

## 2024-03-20 PROCEDURE — 2500000003 HC RX 250 WO HCPCS: Performed by: NURSE ANESTHETIST, CERTIFIED REGISTERED

## 2024-03-20 PROCEDURE — 2720000010 HC SURG SUPPLY STERILE: Performed by: SURGERY

## 2024-03-20 PROCEDURE — 81025 URINE PREGNANCY TEST: CPT

## 2024-03-20 PROCEDURE — C9290 INJ, BUPIVACAINE LIPOSOME: HCPCS | Performed by: SURGERY

## 2024-03-20 PROCEDURE — A4217 STERILE WATER/SALINE, 500 ML: HCPCS | Performed by: SURGERY

## 2024-03-20 PROCEDURE — 3600000014 HC SURGERY LEVEL 4 ADDTL 15MIN: Performed by: SURGERY

## 2024-03-20 PROCEDURE — 6360000002 HC RX W HCPCS: Performed by: NURSE ANESTHETIST, CERTIFIED REGISTERED

## 2024-03-20 PROCEDURE — 3600000004 HC SURGERY LEVEL 4 BASE: Performed by: SURGERY

## 2024-03-20 PROCEDURE — C1781 MESH (IMPLANTABLE): HCPCS | Performed by: SURGERY

## 2024-03-20 PROCEDURE — 6360000002 HC RX W HCPCS: Performed by: SURGERY

## 2024-03-20 PROCEDURE — 2709999900 HC NON-CHARGEABLE SUPPLY: Performed by: SURGERY

## 2024-03-20 PROCEDURE — 3700000000 HC ANESTHESIA ATTENDED CARE: Performed by: SURGERY

## 2024-03-20 PROCEDURE — 2580000003 HC RX 258: Performed by: ANESTHESIOLOGY

## 2024-03-20 PROCEDURE — 3700000001 HC ADD 15 MINUTES (ANESTHESIA): Performed by: SURGERY

## 2024-03-20 PROCEDURE — 7100000000 HC PACU RECOVERY - FIRST 15 MIN: Performed by: SURGERY

## 2024-03-20 PROCEDURE — C1789 PROSTHESIS, BREAST, IMP: HCPCS | Performed by: SURGERY

## 2024-03-20 PROCEDURE — 7100000001 HC PACU RECOVERY - ADDTL 15 MIN: Performed by: SURGERY

## 2024-03-20 PROCEDURE — 6370000000 HC RX 637 (ALT 250 FOR IP): Performed by: ANESTHESIOLOGY

## 2024-03-20 PROCEDURE — 2500000003 HC RX 250 WO HCPCS: Performed by: SURGERY

## 2024-03-20 DEVICE — IMPLANTABLE DEVICE: Type: IMPLANTABLE DEVICE | Site: BREAST | Status: FUNCTIONAL

## 2024-03-20 DEVICE — GRAFT HUM TISS L W13XL22CM THK0.7-1.4MM THN ACELLULAR: Type: IMPLANTABLE DEVICE | Site: BREAST | Status: FUNCTIONAL

## 2024-03-20 RX ORDER — HYDROMORPHONE HYDROCHLORIDE 2 MG/ML
INJECTION, SOLUTION INTRAMUSCULAR; INTRAVENOUS; SUBCUTANEOUS PRN
Status: DISCONTINUED | OUTPATIENT
Start: 2024-03-20 | End: 2024-03-20 | Stop reason: SDUPTHER

## 2024-03-20 RX ORDER — ONDANSETRON 2 MG/ML
INJECTION INTRAMUSCULAR; INTRAVENOUS PRN
Status: DISCONTINUED | OUTPATIENT
Start: 2024-03-20 | End: 2024-03-20 | Stop reason: SDUPTHER

## 2024-03-20 RX ORDER — DEXMEDETOMIDINE HYDROCHLORIDE 100 UG/ML
INJECTION, SOLUTION INTRAVENOUS PRN
Status: DISCONTINUED | OUTPATIENT
Start: 2024-03-20 | End: 2024-03-20 | Stop reason: SDUPTHER

## 2024-03-20 RX ORDER — DROPERIDOL 2.5 MG/ML
0.62 INJECTION, SOLUTION INTRAMUSCULAR; INTRAVENOUS
Status: DISCONTINUED | OUTPATIENT
Start: 2024-03-20 | End: 2024-03-20 | Stop reason: HOSPADM

## 2024-03-20 RX ORDER — ACETAMINOPHEN 325 MG/1
650 TABLET ORAL ONCE
Status: COMPLETED | OUTPATIENT
Start: 2024-03-20 | End: 2024-03-20

## 2024-03-20 RX ORDER — SODIUM CHLORIDE, SODIUM LACTATE, POTASSIUM CHLORIDE, CALCIUM CHLORIDE 600; 310; 30; 20 MG/100ML; MG/100ML; MG/100ML; MG/100ML
INJECTION, SOLUTION INTRAVENOUS CONTINUOUS
Status: DISCONTINUED | OUTPATIENT
Start: 2024-03-20 | End: 2024-03-20 | Stop reason: HOSPADM

## 2024-03-20 RX ORDER — DIPHENHYDRAMINE HYDROCHLORIDE 50 MG/ML
12.5 INJECTION INTRAMUSCULAR; INTRAVENOUS
Status: DISCONTINUED | OUTPATIENT
Start: 2024-03-20 | End: 2024-03-20 | Stop reason: HOSPADM

## 2024-03-20 RX ORDER — FENTANYL CITRATE 50 UG/ML
INJECTION, SOLUTION INTRAMUSCULAR; INTRAVENOUS PRN
Status: DISCONTINUED | OUTPATIENT
Start: 2024-03-20 | End: 2024-03-20 | Stop reason: SDUPTHER

## 2024-03-20 RX ORDER — MIDAZOLAM HYDROCHLORIDE 1 MG/ML
INJECTION INTRAMUSCULAR; INTRAVENOUS PRN
Status: DISCONTINUED | OUTPATIENT
Start: 2024-03-20 | End: 2024-03-20 | Stop reason: SDUPTHER

## 2024-03-20 RX ORDER — KETAMINE HCL IN NACL, ISO-OSM 100MG/10ML
SYRINGE (ML) INJECTION PRN
Status: DISCONTINUED | OUTPATIENT
Start: 2024-03-20 | End: 2024-03-20 | Stop reason: SDUPTHER

## 2024-03-20 RX ORDER — LIDOCAINE HYDROCHLORIDE 10 MG/ML
1 INJECTION, SOLUTION EPIDURAL; INFILTRATION; INTRACAUDAL; PERINEURAL
Status: DISCONTINUED | OUTPATIENT
Start: 2024-03-20 | End: 2024-03-20 | Stop reason: HOSPADM

## 2024-03-20 RX ORDER — SCOLOPAMINE TRANSDERMAL SYSTEM 1 MG/1
1 PATCH, EXTENDED RELEASE TRANSDERMAL
Status: DISCONTINUED | OUTPATIENT
Start: 2024-03-20 | End: 2024-03-20 | Stop reason: HOSPADM

## 2024-03-20 RX ORDER — POVIDONE-IODINE 10 MG/G
OINTMENT TOPICAL PRN
Status: DISCONTINUED | OUTPATIENT
Start: 2024-03-20 | End: 2024-03-20 | Stop reason: ALTCHOICE

## 2024-03-20 RX ORDER — ONDANSETRON 2 MG/ML
4 INJECTION INTRAMUSCULAR; INTRAVENOUS
Status: DISCONTINUED | OUTPATIENT
Start: 2024-03-20 | End: 2024-03-20 | Stop reason: HOSPADM

## 2024-03-20 RX ORDER — SODIUM CHLORIDE, SODIUM LACTATE, POTASSIUM CHLORIDE, CALCIUM CHLORIDE 600; 310; 30; 20 MG/100ML; MG/100ML; MG/100ML; MG/100ML
INJECTION, SOLUTION INTRAVENOUS CONTINUOUS PRN
Status: DISCONTINUED | OUTPATIENT
Start: 2024-03-20 | End: 2024-03-20 | Stop reason: SDUPTHER

## 2024-03-20 RX ORDER — LABETALOL HYDROCHLORIDE 5 MG/ML
10 INJECTION, SOLUTION INTRAVENOUS
Status: DISCONTINUED | OUTPATIENT
Start: 2024-03-20 | End: 2024-03-20 | Stop reason: HOSPADM

## 2024-03-20 RX ORDER — MEPERIDINE HYDROCHLORIDE 25 MG/ML
12.5 INJECTION INTRAMUSCULAR; INTRAVENOUS; SUBCUTANEOUS EVERY 5 MIN PRN
Status: DISCONTINUED | OUTPATIENT
Start: 2024-03-20 | End: 2024-03-20 | Stop reason: HOSPADM

## 2024-03-20 RX ORDER — PHENYLEPHRINE HCL IN 0.9% NACL 0.4MG/10ML
SYRINGE (ML) INTRAVENOUS PRN
Status: DISCONTINUED | OUTPATIENT
Start: 2024-03-20 | End: 2024-03-20 | Stop reason: SDUPTHER

## 2024-03-20 RX ORDER — DEXAMETHASONE SODIUM PHOSPHATE 4 MG/ML
INJECTION, SOLUTION INTRA-ARTICULAR; INTRALESIONAL; INTRAMUSCULAR; INTRAVENOUS; SOFT TISSUE PRN
Status: DISCONTINUED | OUTPATIENT
Start: 2024-03-20 | End: 2024-03-20 | Stop reason: SDUPTHER

## 2024-03-20 RX ORDER — PROPOFOL 10 MG/ML
INJECTION, EMULSION INTRAVENOUS CONTINUOUS PRN
Status: DISCONTINUED | OUTPATIENT
Start: 2024-03-20 | End: 2024-03-20 | Stop reason: SDUPTHER

## 2024-03-20 RX ORDER — ROCURONIUM BROMIDE 10 MG/ML
INJECTION, SOLUTION INTRAVENOUS PRN
Status: DISCONTINUED | OUTPATIENT
Start: 2024-03-20 | End: 2024-03-20 | Stop reason: SDUPTHER

## 2024-03-20 RX ORDER — NALOXONE HYDROCHLORIDE 0.4 MG/ML
INJECTION, SOLUTION INTRAMUSCULAR; INTRAVENOUS; SUBCUTANEOUS PRN
Status: DISCONTINUED | OUTPATIENT
Start: 2024-03-20 | End: 2024-03-20 | Stop reason: HOSPADM

## 2024-03-20 RX ADMIN — SODIUM CHLORIDE, POTASSIUM CHLORIDE, SODIUM LACTATE AND CALCIUM CHLORIDE: 600; 310; 30; 20 INJECTION, SOLUTION INTRAVENOUS at 09:48

## 2024-03-20 RX ADMIN — FENTANYL CITRATE 50 MCG: 50 INJECTION, SOLUTION INTRAMUSCULAR; INTRAVENOUS at 11:29

## 2024-03-20 RX ADMIN — PROPOFOL 125 MCG/KG/MIN: 10 INJECTION, EMULSION INTRAVENOUS at 10:55

## 2024-03-20 RX ADMIN — Medication 20 MG: at 09:55

## 2024-03-20 RX ADMIN — ACETAMINOPHEN 650 MG: 325 TABLET ORAL at 09:07

## 2024-03-20 RX ADMIN — SUGAMMADEX 129 MG: 100 INJECTION, SOLUTION INTRAVENOUS at 12:34

## 2024-03-20 RX ADMIN — PROPOFOL 125 MCG/KG/MIN: 10 INJECTION, EMULSION INTRAVENOUS at 09:56

## 2024-03-20 RX ADMIN — HYDROMORPHONE HYDROCHLORIDE 1 MG: 2 INJECTION, SOLUTION INTRAMUSCULAR; INTRAVENOUS; SUBCUTANEOUS at 12:45

## 2024-03-20 RX ADMIN — FENTANYL CITRATE 50 MCG: 50 INJECTION, SOLUTION INTRAMUSCULAR; INTRAVENOUS at 10:57

## 2024-03-20 RX ADMIN — Medication 80 MCG: at 10:10

## 2024-03-20 RX ADMIN — SODIUM CHLORIDE, POTASSIUM CHLORIDE, SODIUM LACTATE AND CALCIUM CHLORIDE: 600; 310; 30; 20 INJECTION, SOLUTION INTRAVENOUS at 08:50

## 2024-03-20 RX ADMIN — FENTANYL CITRATE 100 MCG: 50 INJECTION, SOLUTION INTRAMUSCULAR; INTRAVENOUS at 09:52

## 2024-03-20 RX ADMIN — FENTANYL CITRATE 50 MCG: 50 INJECTION, SOLUTION INTRAMUSCULAR; INTRAVENOUS at 12:18

## 2024-03-20 RX ADMIN — MIDAZOLAM HYDROCHLORIDE 2 MG: 1 INJECTION, SOLUTION INTRAMUSCULAR; INTRAVENOUS at 09:48

## 2024-03-20 RX ADMIN — ROCURONIUM BROMIDE 50 MG: 10 INJECTION, SOLUTION INTRAVENOUS at 11:02

## 2024-03-20 RX ADMIN — Medication 80 MCG: at 10:19

## 2024-03-20 RX ADMIN — ONDANSETRON 4 MG: 2 INJECTION INTRAMUSCULAR; INTRAVENOUS at 12:08

## 2024-03-20 RX ADMIN — WATER 2000 MG: 1 INJECTION INTRAMUSCULAR; INTRAVENOUS; SUBCUTANEOUS at 10:03

## 2024-03-20 RX ADMIN — DEXAMETHASONE SODIUM PHOSPHATE 8 MG: 4 INJECTION INTRA-ARTICULAR; INTRALESIONAL; INTRAMUSCULAR; INTRAVENOUS; SOFT TISSUE at 10:06

## 2024-03-20 RX ADMIN — DEXMEDETOMIDINE 5 MCG: 100 INJECTION, SOLUTION INTRAVENOUS at 12:31

## 2024-03-20 RX ADMIN — Medication 20 MG: at 10:52

## 2024-03-20 RX ADMIN — PROPOFOL 125 MCG/KG/MIN: 10 INJECTION, EMULSION INTRAVENOUS at 11:58

## 2024-03-20 RX ADMIN — ROCURONIUM BROMIDE 50 MG: 10 INJECTION, SOLUTION INTRAVENOUS at 09:58

## 2024-03-20 ASSESSMENT — PAIN - FUNCTIONAL ASSESSMENT: PAIN_FUNCTIONAL_ASSESSMENT: 0-10

## 2024-03-20 ASSESSMENT — PAIN SCALES - GENERAL: PAINLEVEL_OUTOF10: 0

## 2024-03-20 NOTE — ANESTHESIA POSTPROCEDURE EVALUATION
Department of Anesthesiology  Postprocedure Note    Patient: Zayra Quinones  MRN: [id number]  YOB: 1990  Date of evaluation: 3/20/2024    Procedure Summary       Date: 03/20/24 Room / Location: [facility] MAIN OR  / M MAIN OR    Anesthesia Start: 0948 Anesthesia Stop: 1255    Procedure: REVISION RIGHT BREAST RECONSTRUCTION WITH IMPLANT EXCHANGE AND DURASORB MESH, REVISION LEFT BREAST RECONSTRUCTION IMPLANT EXCHANGE POCKET REVISION WITH FLEX HD, BILATERAL AUTOLOGUS TISSUE FAT TRANSFER (Bilateral: Breast) Diagnosis:       BRCA positive      Deformity of reconstructed breast      Capsular contracture of breast implant, initial encounter      (BRCA positive [Z15.01, Z15.09])      (Deformity of reconstructed breast [N65.0])      (Capsular contracture of breast implant, initial encounter [T85.44XA])    Surgeons: Bravo Mcdaniels MD Responsible Provider: Richard Bose DO    Anesthesia Type: General, TIVA ASA Status: 1            Anesthesia Type: General, TIVA    Juany Phase I: Juany Score: 9    Juany Phase II:      Anesthesia Post Evaluation    Patient location during evaluation: PACU  Patient participation: complete - patient participated  Level of consciousness: awake  Airway patency: patent  Nausea & Vomiting: no vomiting and no nausea  Cardiovascular status: hemodynamically stable  Respiratory status: acceptable  Hydration status: stable  Comments: Patient seen and examined.  Ready for discharge from PACU.  Pain management: satisfactory to patient    No notable events documented.

## 2024-03-20 NOTE — H&P
Plastic Surgery PRE-OP Admission History and Physical    Patient: Zayra Quinones MRN: [id number]  SSN: [id number]    YOB: 1990  Age: 33 y.o.  Sex: female            Subjective:   Patient is a 33 y.o.  female who presents for previously scheduled surgical intervention of revision bilateral implant exchange with placement of mesh and autologous fat transfer to bilateral breasts .  The patient was evaluated and determined the most appropriate plan of care is to proceed with surgical intervention.Patient denies chest pain, tightness, pain radiating down left arm, palpitations, dizziness, lightheadedness, fevers, chills. Patient denies shortness of breath, wheezing, diarrhea, constipation, abdominal pain. Patient denies urinary problems, dysuria, hesitancy, urgency. Denies skin breakdown, rashes, insect bites or open area.      Patient Active Problem List    Diagnosis Date Noted    Family hx-breast malignancy 02/24/2022    Pregnancy 04/26/2016    Pregnant 10/21/2014     Past Medical History:   Diagnosis Date    PONV (postoperative nausea and vomiting)     PONV X2 surgeries    Unspecified adverse effect of anesthesia     \"takes more than usual to get me to sleep\", w/every surgery, not wisdom teeth sedation      Past Surgical History:   Procedure Laterality Date    BREAST RECONSTRUCTION Bilateral 11/23/2022    REVISION BREAST RECONSTRUCTION WITH BILATERAL BREAST IMPLANT EXCHANGE, TOTAL CAPSULECTOMY AND ACELLAR DERMAL MATRIX AND AUTOLOGUS TISSUE FAT TRANSFER TO BILATERAL BREASTS performed by Bravo Mcdaniels MD at [facility] MAIN OR    BREAST RECONSTRUCTION Bilateral 04/04/2022    BILATERAL BREAST RECONSTRUCTION, DIRECT TO IMPLANT RECONSTRUCTION AND FLEX HD performed by Bravo Mcdaniels MD at [facility] AMBULATORY OR    BREAST RECONSTRUCTION Bilateral 06/27/2022    REVISION BILATERAL BREAST RECONSTRUCTION WITH POCKET REVISION AND FAT GRAFTING TO BILATERAL BREASTS performed by Bravo Mcdaniels MD at [facility] MAIN OR

## 2024-03-20 NOTE — DISCHARGE INSTRUCTIONS
Breast Reconstruction   RADHAMES Stanley      Activities  Immediately after  surgery you will rest quietly for the first 48 hours.  You will be able to walk around the house and perform light daily activities; however, during this time, it is not at all unusual for you to feel some pain, soreness and pressure in the chest area.  This will gradually subside and Dr. Mcdaniels will give you pain medication to relieve it.    Be sure to lie on your back whenever you rest or sleep.  Keep your head elevated for 72 hours after surgery as this will help with swelling.    No heavy exercise or lifting for three weeks following surgery. This will allow the implants to remain in the proper position without movement.  For the first three days following surgery you should try to restrict your arm movements.  Move your arms slowly and avoid sudden jerky movements of the chest and breast area.  Keep your arms as close to your body as possible.  This allows the implants to remain in place.    Dr. Mcdaniels encourages walking immediately after surgery.  This activity will greatly minimize the risk of blood clots in your leg veins.    DO NOT PUT ICE OR ICE PACKS ON YOUR BREASTS       Bathing:  You will then be allowed to shower two days after surgery.  The fluffy guaze that is also on your incisions will be able to come off and discarded. You will have steri strips on all of your incisions. The office staff will remove these steri strips approx 7-10 days surgery. Water may run over the steri strips gently, but, do not allow direct pressure of water on the steri strips.     Do not submerge yourself in a bath, swimming pool, or whirlpool for two weeks.    Under garments: The surgery bra that you have been put in should be worn constantly during the day and at night.  You will be changed out of that surgery bra to a more comfortable bra in the office at your first post operative appointment. You will wear the sports bra for

## 2024-03-20 NOTE — ANESTHESIA PRE PROCEDURE
Department of Anesthesiology  Preprocedure Note       Name:  Zayra Quinones   Age:  33 y.o.  :  1990                                          MRN:  [id number]         Date:  3/20/2024      Surgeon: Surgeon(s):  Bravo Mcdaniels MD    Procedure: Procedure(s):  REVISION RIGHT BREAST RECONSTRUCTION WITH IMPLANT EXCHANGE AND DURASORB MESH, REVISION LEFT BREAST RECONSTRUCTION IMPLANT EXCHANGE POCKET REVISION WITH FLEX HD, BILATERAL AUTOLOGUS TISSUE FAT TRANSFER    Medications prior to admission:   Prior to Admission medications    Not on File       Current medications:    Current Facility-Administered Medications   Medication Dose Route Frequency Provider Last Rate Last Admin   • lidocaine PF 1 % injection 1 mL  1 mL IntraDERmal Once PRN Richard Bose DO       • acetaminophen (TYLENOL) tablet 650 mg  650 mg Oral Once Richard Bose DO       • lactated ringers IV soln infusion   IntraVENous Continuous Richard Bose  mL/hr at 24 0850 New Bag at 24 0850   • ceFAZolin (ANCEF) 2,000 mg in sterile water 20 mL IV syringe  2,000 mg IntraVENous Once Bravo Mcdaniels MD           Allergies:  No Known Allergies    Problem List:    Patient Active Problem List   Diagnosis Code   • Pregnancy Z34.90   • Family hx-breast malignancy Z80.3   • Pregnant Z34.90       Past Medical History:        Diagnosis Date   • PONV (postoperative nausea and vomiting)     PONV X2 surgeries   • Unspecified adverse effect of anesthesia     \"takes more than usual to get me to sleep\", w/every surgery, not wisdom teeth sedation       Past Surgical History:        Procedure Laterality Date   • BREAST RECONSTRUCTION Bilateral 2022    REVISION BREAST RECONSTRUCTION WITH BILATERAL BREAST IMPLANT EXCHANGE, TOTAL CAPSULECTOMY AND ACELLAR DERMAL MATRIX AND AUTOLOGUS TISSUE FAT TRANSFER TO BILATERAL BREASTS performed by Bravo Mcdaniels MD at [facility] MAIN OR   • BREAST RECONSTRUCTION Bilateral 2022    BILATERAL BREAST

## 2024-03-20 NOTE — OP NOTE
[facility]          [address] [place]  [address]                            OPERATIVE REPORT      PATIENT NAME: ARNULFO WATERS                : 1990  MED REC NO: [id number]                       ROOM: OR  ACCOUNT NO: [id number]                       ADMIT DATE: 2024  PROVIDER: Bravo Mcdaniels MD    DATE OF SERVICE:  2024    PREOPERATIVE DIAGNOSES:       1. Genetic propensity to breast cancer.     2. Surgical absence, bilateral breasts.     3. Breast asymmetry.     4. Distortion reconstruction of breast.     5. Capsular contracture.    POSTOPERATIVE DIAGNOSES:       1. Genetic propensity to breast cancer.     2. Surgical absence, bilateral breasts.     3. Breast asymmetry.     4. Distortion reconstruction of breast.     5. Capsular contracture.    PROCEDURES PERFORMED:       1. Reconstruction of right and left breast with removal of silicone implants.     2. Reconstruction of right and left breast with delayed insertion silicone implants.     3. Reconstruction of right and left breast with extensive open capsulotomy and capsulectomy.     4. Reconstruction of right and left breast with revisional lateral inferior capsulorrhaphy.     5. Reconstruction of right breast with DuraSorb mesh.     6. Reconstruction of left breast with pectoralis major muscle flap.     7. Reconstruction of left breast with acellular dermal matrix FlexHD 12 cm x 22 cm.     8. Reconstruction of left breast with excision of hypertrophic scar 9 cm x 2 cm.     9. Reconstruction of right and left breast with autologous tissue fat transfer, 105 mL total.     10. Multilevel intercostal nerve block, bilateral chest wall.    SURGEON:  Bravo Mcdaniels MD    ASSISTANT:  Dayana Hackett PA-C    Due to the complexity of this procedure, surgical assistance was required.   RADHAMES Hackett assisted with the harvesting and placement of autologous fat, implant removal, implant exchange,

## 2024-03-20 NOTE — BRIEF OP NOTE
Brief Postoperative Note      Patient: Zayra Quinones  YOB: 1990  MRN: [id number]    Date of Procedure: 3/20/2024    Pre-Op Diagnosis Codes:     * BRCA positive [Z15.01, Z15.09]     * Deformity of reconstructed breast [N65.0]     * Capsular contracture of breast implant, initial encounter [T85.44XA]    Post-Op Diagnosis: Same       Procedure(s):  REVISION RIGHT BREAST RECONSTRUCTION WITH IMPLANT EXCHANGE AND DURASORB MESH, REVISION LEFT BREAST RECONSTRUCTION IMPLANT EXCHANGE POCKET REVISION WITH FLEX HD, BILATERAL AUTOLOGUS TISSUE FAT TRANSFER    Surgeon(s):  Bravo Mcdaniels MD    Assistant:  Physician Assistant: Dayana Hackett PA-C    Anesthesia: General    Estimated Blood Loss (mL): less than 100     Complications: None    Specimens:   ID Type Source Tests Collected by Time Destination   1 : excised scar LEFT breast Tissue Breast SURGICAL PATHOLOGY Bravo Mcdaniels MD 3/20/2024 1115        Implants:  Implant Name Type Inv. Item Serial No.  Lot No. LRB No. Used Action   IMPLANT BRST GEL 5.2 CM PROJCT 14.1  CC MOD+ PROF [facility] - [id number]  IMPLANT BRST GEL 5.2 CM PROJCT 14.1  CC MOD+ PROF [facility] [id number] [employer]WD NA Right 1 Implanted   MESH MONOFILAMENT 10CM X 25CM - [id number]  MESH MONOFILAMENT 10CM X 25CM [id number] [employer]- [id number] Right 1 Implanted   GRAFT HUM TISS L [id number] THK0.7-1.4MM THN ACELLULAR - [id number]  GRAFT HUM TISS L [id number] THK0.7-1.4MM THN ACELLULAR [id number] [employer]-  Left 1 Implanted   IMPLANT BRST GEL 5.3 CM PROJCT 14.6  CC SMOOTH RND [facility] - [id number]  IMPLANT BRST GEL 5.3 CM PROJCT 14.6  CC SMOOTH RND [facility] [id number] [employer] NA Left 1 Implanted         Drains: * No LDAs found *    Findings: none      Electronically signed by Bravo Mcdaniels MD on 3/20/2024 at 12:30 PM

## 2025-01-09 NOTE — ANESTHESIA PREPROCEDURE EVALUATION
Relevant Problems   No relevant active problems       Anesthetic History     PONV          Review of Systems / Medical History  Patient summary reviewed and nursing notes reviewed    Pulmonary  Within defined limits                 Neuro/Psych   Within defined limits           Cardiovascular  Within defined limits                Exercise tolerance: >4 METS     GI/Hepatic/Renal  Within defined limits              Endo/Other  Within defined limits           Other Findings              Physical Exam    Airway  Mallampati: II    Neck ROM: normal range of motion   Mouth opening: Normal     Cardiovascular    Rhythm: regular  Rate: normal         Dental  No notable dental hx       Pulmonary  Breath sounds clear to auscultation               Abdominal         Other Findings            Anesthetic Plan    ASA: 1  Anesthesia type: general and total IV anesthesia          Induction: Intravenous  Anesthetic plan and risks discussed with: Patient      Informed consent obtained. Differential Diagnosis ddx considered but not lmited to dehdyration, hyperthyroid, anemia

## (undated) DEVICE — DRAPE,CHEST,FENES,15X10,STERIL: Brand: MEDLINE

## (undated) DEVICE — SUT PROL 0 30IN CT1 BLU --

## (undated) DEVICE — TUBING, SUCTION, 1/4" X 10', STRAIGHT: Brand: MEDLINE

## (undated) DEVICE — HYPODERMIC SAFETY NEEDLE: Brand: MONOJECT

## (undated) DEVICE — SUTURE MCRYL + SZ 3-0 L27IN ABSRB UD L26MM SH 1/2 CIR MCP416H

## (undated) DEVICE — SUTURE PDS II SZ 2-0 L27IN ABSRB VLT SH L26MM 1/2 CIR Z317H

## (undated) DEVICE — BLADE ELECTRODE: Brand: EDGE

## (undated) DEVICE — CANISTER, RIGID, 3000CC: Brand: MEDLINE INDUSTRIES, INC.

## (undated) DEVICE — SOL INJ L R 1000ML BG --

## (undated) DEVICE — DRAPE FLD WRM W44XL66IN C6L FOR INTRATEMP SYS THERMABASIN

## (undated) DEVICE — 4-PORT MANIFOLD: Brand: NEPTUNE 2

## (undated) DEVICE — SPONGE GZ W4XL4IN COT 12 PLY TYP VII WVN C FLD DSGN

## (undated) DEVICE — 3M™ TEGADERM™ TRANSPARENT FILM DRESSING FRAME STYLE, 1624W, 2-3/8 IN X 2-3/4 IN (6 CM X 7 CM), 100/CT 4CT/CASE: Brand: 3M™ TEGADERM™

## (undated) DEVICE — 3M™ TEGADERM™ TRANSPARENT FILM DRESSING FRAME STYLE, 1626W, 4 IN X 4-3/4 IN (10 CM X 12 CM), 50/CT 4CT/CASE: Brand: 3M™ TEGADERM™

## (undated) DEVICE — ROCKER SWITCH PENCIL BLADE ELECTRODE, HOLSTER: Brand: EDGE

## (undated) DEVICE — TOWEL SURG W17XL27IN STD BLU COT NONFENESTRATED PREWASHED

## (undated) DEVICE — SUTURE PLN GUT SZ 5-0 L18IN ABSRB YELLOWISH TAN L13MM PC-1 1915G

## (undated) DEVICE — GLOVE ORANGE PI 7 1/2   MSG9075

## (undated) DEVICE — SUTURE MCRYL SZ 3-0 L27IN ABSRB UD PS-2 3/8 CIR REV CUT NDL MCP427H

## (undated) DEVICE — SOLUTION IRRIG 1000ML 0.9% SOD CHL USP POUR PLAS BTL

## (undated) DEVICE — RESERVOIR,SUCTION,100CC,SILICONE: Brand: MEDLINE

## (undated) DEVICE — GLOVE SURG SZ 65 THK91MIL LTX FREE SYN POLYISOPRENE

## (undated) DEVICE — SUTURE MCRYL SZ 2-0 L27IN ABSRB UD SH L26MM TAPERPOINT NDL Y417H

## (undated) DEVICE — PLASTICS -SFMC: Brand: MEDLINE INDUSTRIES, INC.

## (undated) DEVICE — KIT SURG PREP POVIDONE IOD PRESATURATED PAINT WET FOR UNIV

## (undated) DEVICE — STICK SPONGE PRESAT PVP PAINT STERILE

## (undated) DEVICE — SYSTEM IMPL DEL FOR BRST IMPL FUN (SEE COMMENT)

## (undated) DEVICE — 3M™ TEGADERM™ TRANSPARENT FILM DRESSING FRAME STYLE, 1626, 4 IN X 4-3/4 IN (10 CM X 12 CM), 50/CT 4CT/CASE: Brand: 3M™ TEGADERM™

## (undated) DEVICE — BANDAGE COBAN 4 IN COMPR W4INXL5YD FOAM COHESIVE QUIK STK SELF ADH SFT

## (undated) DEVICE — 3MM SINGLE USE CANNULA, 8MM PORT, 30CM LONG, FLARED MERCEDES: Brand: MICROAIRE®

## (undated) DEVICE — BRA SURG POST-OP MED 38IN --

## (undated) DEVICE — STRAP,POSITIONING,KNEE/BODY,FOAM,4X60": Brand: MEDLINE

## (undated) DEVICE — SPONGE GZ W4XL4IN COT 12 PLY TYP VII WVN C FLD DSGN STERILE

## (undated) DEVICE — BINDER ABD SM/M H9IN FOR 30-45IN WHT 3 SLD PNL DSGN HOOP

## (undated) DEVICE — SYSTEM IMPL DEL FOR BRST IMPL FUN EA = 5 UNITS

## (undated) DEVICE — SILICONE GEL BREAST IMPLANT, SMOOTH ROUND, MODERATE PLUS PROFILE, 540 CC
Type: IMPLANTABLE DEVICE | Site: BREAST | Status: NON-FUNCTIONAL
Brand: SIENTRA SILICONE GEL BREAST IMPLANT

## (undated) DEVICE — SYRINGE CATH TIP 50ML

## (undated) DEVICE — SYRINGE MED 30ML STD CLR PLAS LUERLOCK TIP N CTRL DISP

## (undated) DEVICE — AEGIS 1" DISK 4MM HOLE, PEEL OPEN: Brand: MEDLINE

## (undated) DEVICE — SUT ETHLN 2-0 18IN FS BLK --

## (undated) DEVICE — 5MM SINGLE USE CANNULA, 10MM PORT, 30CM LONG, FLARED MERCEDES: Brand: MICROAIRE®

## (undated) DEVICE — GLOVE SURG SZ 7 L12IN FNGR THK79MIL GRN LTX FREE

## (undated) DEVICE — COVER LT HNDL PLAS RIG 1 PER PK

## (undated) DEVICE — STRIP,CLOSURE,WOUND,MEDI-STRIP,1/2X4: Brand: MEDLINE

## (undated) DEVICE — SYRINGE MED 50ML LUERLOCK TIP

## (undated) DEVICE — SUTURE MCRYL SZ 3-0 L27IN ABSRB UD L19MM PS-2 3/8 CIR PRIM Y427H

## (undated) DEVICE — BLADE ES L6IN ELASTOMERIC COAT EXT DURABLE BEND UPTO 90DEG

## (undated) DEVICE — BRA SURG POST-OP LG 42IN --

## (undated) DEVICE — SYR 50ML LR LCK 1ML GRAD NSAF --

## (undated) DEVICE — BLANKET WRM W35.4XL86.6IN FULL UNDERBODY + FORC AIR

## (undated) DEVICE — INTENDED FOR TISSUE SEPARATION, AND OTHER PROCEDURES THAT REQUIRE A SHARP SURGICAL BLADE TO PUNCTURE OR CUT.: Brand: BARD-PARKER ® CARBON RIB-BACK BLADES

## (undated) DEVICE — PREMIUM WET SKIN PREP TRAY: Brand: MEDLINE INDUSTRIES, INC.

## (undated) DEVICE — SOLUTION IV 1000ML LAC RINGERS PH 6.5 INJ USP VIAFLX PLAS

## (undated) DEVICE — TRAY PREP DRY W/ PREM GLV 2 APPL 6 SPNG 2 UNDPD 1 OVERWRAP

## (undated) DEVICE — GOWN,SIRUS,NONRNF,SETINSLV,2XL,18/CS: Brand: MEDLINE

## (undated) DEVICE — TUBING SUCT L9FT FOR AUTOFUSE INFLTR SYS

## (undated) DEVICE — BRA SURG POST-OP XL 46IN --

## (undated) DEVICE — BINDER ABD S/M 9X30-45IN --

## (undated) DEVICE — STAPLER SKIN H3.9MM WIRE DIA0.58MM CRWN 6.9MM 35 STPL ROT

## (undated) DEVICE — BRA COMPR LG NYL LYCRA SPANDEX WHT CURAD

## (undated) DEVICE — SUTURE VCRL SZ 3-0 L27IN ABSRB UD L26MM SH 1/2 CIR J416H

## (undated) DEVICE — ASPIRATION TUBING SET, DISPOSABLE: Brand: MICROAIRE®

## (undated) DEVICE — SKN PREP SPNG STKS PVP PNT STR: Brand: MEDLINE INDUSTRIES, INC.

## (undated) DEVICE — SPONGE LAP 18X18IN STRL -- 5/PK

## (undated) DEVICE — SYSTEM FAT TRANSFER LIPOASPIRATE WSH STRL VIALITY LTX

## (undated) DEVICE — SUT ETHLN 3-0 18IN PS2 BLK --

## (undated) DEVICE — 4MM SINGLE USE CANNULA, 10MM PORT, 30CM LONG, FLARED MERCEDES: Brand: MICROAIRE®

## (undated) DEVICE — CHEST PACK-SFMCASU: Brand: MEDLINE INDUSTRIES, INC.

## (undated) DEVICE — PACK,ORTOHMAX/CVMAX,UNIVERSAL,5/CS: Brand: MEDLINE

## (undated) DEVICE — SUT SLK 2-0SH 30IN BLK --

## (undated) DEVICE — BLADE,CARBON-STEEL,11,STRL,DISPOSABLE,TB: Brand: MEDLINE

## (undated) DEVICE — BOWL MED L 32OZ PLAS W/ MOLD GRAD EZ OPN PEEL PCH

## (undated) DEVICE — SOLUTION IRRIG 1000ML STRL H2O USP PLAS POUR BTL